# Patient Record
Sex: FEMALE | Race: WHITE | NOT HISPANIC OR LATINO | Employment: FULL TIME | URBAN - METROPOLITAN AREA
[De-identification: names, ages, dates, MRNs, and addresses within clinical notes are randomized per-mention and may not be internally consistent; named-entity substitution may affect disease eponyms.]

---

## 2020-01-22 ENCOUNTER — OFFICE VISIT (OUTPATIENT)
Dept: FAMILY MEDICINE CLINIC | Facility: CLINIC | Age: 35
End: 2020-01-22

## 2020-01-22 VITALS
WEIGHT: 160 LBS | RESPIRATION RATE: 18 BRPM | DIASTOLIC BLOOD PRESSURE: 60 MMHG | TEMPERATURE: 97.5 F | OXYGEN SATURATION: 100 % | SYSTOLIC BLOOD PRESSURE: 110 MMHG | HEART RATE: 82 BPM | HEIGHT: 61 IN | BODY MASS INDEX: 30.21 KG/M2

## 2020-01-22 DIAGNOSIS — Z00.00 HEALTHCARE MAINTENANCE: ICD-10-CM

## 2020-01-22 DIAGNOSIS — M25.50 MULTIPLE JOINT PAIN: ICD-10-CM

## 2020-01-22 DIAGNOSIS — R53.83 FATIGUE, UNSPECIFIED TYPE: ICD-10-CM

## 2020-01-22 DIAGNOSIS — F41.9 ANXIETY: ICD-10-CM

## 2020-01-22 DIAGNOSIS — R07.89 ATYPICAL CHEST PAIN: ICD-10-CM

## 2020-01-22 DIAGNOSIS — Z00.00 ENCOUNTER FOR MEDICAL EXAMINATION TO ESTABLISH CARE: Primary | ICD-10-CM

## 2020-01-22 LAB
BILIRUB BLD-MCNC: NEGATIVE MG/DL
CLARITY, POC: CLEAR
COLOR UR: YELLOW
GLUCOSE UR STRIP-MCNC: NEGATIVE MG/DL
KETONES UR QL: NEGATIVE
LEUKOCYTE EST, POC: NEGATIVE
NITRITE UR-MCNC: NEGATIVE MG/ML
PH UR: 6 [PH] (ref 5–8)
PROT UR STRIP-MCNC: NEGATIVE MG/DL
RBC # UR STRIP: ABNORMAL /UL
SP GR UR: 1.02 (ref 1–1.03)
UROBILINOGEN UR QL: NORMAL

## 2020-01-22 PROCEDURE — 93000 ELECTROCARDIOGRAM COMPLETE: CPT | Performed by: NURSE PRACTITIONER

## 2020-01-22 PROCEDURE — 99203 OFFICE O/P NEW LOW 30 MIN: CPT | Performed by: NURSE PRACTITIONER

## 2020-01-22 PROCEDURE — 81003 URINALYSIS AUTO W/O SCOPE: CPT | Performed by: NURSE PRACTITIONER

## 2020-01-22 RX ORDER — HYDROXYZINE HYDROCHLORIDE 25 MG/1
25 TABLET, FILM COATED ORAL 3 TIMES DAILY PRN
Qty: 90 TABLET | Refills: 1 | Status: SHIPPED | OUTPATIENT
Start: 2020-01-22 | End: 2021-07-19

## 2020-01-24 ENCOUNTER — LAB (OUTPATIENT)
Dept: FAMILY MEDICINE CLINIC | Facility: CLINIC | Age: 35
End: 2020-01-24

## 2020-01-24 DIAGNOSIS — M25.50 MULTIPLE JOINT PAIN: ICD-10-CM

## 2020-01-24 DIAGNOSIS — Z00.00 HEALTHCARE MAINTENANCE: ICD-10-CM

## 2020-01-24 DIAGNOSIS — R53.83 FATIGUE, UNSPECIFIED TYPE: ICD-10-CM

## 2020-01-24 LAB
ALBUMIN SERPL-MCNC: 4.4 G/DL (ref 3.5–5.2)
ALBUMIN/GLOB SERPL: 1.8 G/DL
ALP SERPL-CCNC: 80 U/L (ref 39–117)
ALT SERPL W P-5'-P-CCNC: 27 U/L (ref 1–33)
ANION GAP SERPL CALCULATED.3IONS-SCNC: 13.7 MMOL/L (ref 5–15)
AST SERPL-CCNC: 18 U/L (ref 1–32)
BASOPHILS # BLD AUTO: 0.03 10*3/MM3 (ref 0–0.2)
BASOPHILS NFR BLD AUTO: 0.4 % (ref 0–1.5)
BILIRUB SERPL-MCNC: 0.4 MG/DL (ref 0.2–1.2)
BUN BLD-MCNC: 11 MG/DL (ref 6–20)
BUN/CREAT SERPL: 15.3 (ref 7–25)
CALCIUM SPEC-SCNC: 9.4 MG/DL (ref 8.6–10.5)
CHLORIDE SERPL-SCNC: 104 MMOL/L (ref 98–107)
CHOLEST SERPL-MCNC: 146 MG/DL (ref 0–200)
CO2 SERPL-SCNC: 25.3 MMOL/L (ref 22–29)
CREAT BLD-MCNC: 0.72 MG/DL (ref 0.57–1)
CRP SERPL-MCNC: 0.35 MG/DL (ref 0–0.5)
DEPRECATED RDW RBC AUTO: 38.9 FL (ref 37–54)
EOSINOPHIL # BLD AUTO: 0.12 10*3/MM3 (ref 0–0.4)
EOSINOPHIL NFR BLD AUTO: 1.6 % (ref 0.3–6.2)
ERYTHROCYTE [DISTWIDTH] IN BLOOD BY AUTOMATED COUNT: 11.8 % (ref 12.3–15.4)
ERYTHROCYTE [SEDIMENTATION RATE] IN BLOOD: >140 MM/HR (ref 0–20)
GFR SERPL CREATININE-BSD FRML MDRD: 94 ML/MIN/1.73
GLOBULIN UR ELPH-MCNC: 2.5 GM/DL
GLUCOSE BLD-MCNC: 90 MG/DL (ref 65–99)
HBA1C MFR BLD: 5.05 % (ref 4.8–5.6)
HCT VFR BLD AUTO: 41.2 % (ref 34–46.6)
HDLC SERPL QL: 3.48
HDLC SERPL-MCNC: 42 MG/DL (ref 40–60)
HGB BLD-MCNC: 14.2 G/DL (ref 12–15.9)
IMM GRANULOCYTES # BLD AUTO: 0.02 10*3/MM3 (ref 0–0.05)
IMM GRANULOCYTES NFR BLD AUTO: 0.3 % (ref 0–0.5)
LDLC SERPL CALC-MCNC: 91 MG/DL (ref 0–100)
LYMPHOCYTES # BLD AUTO: 2.26 10*3/MM3 (ref 0.7–3.1)
LYMPHOCYTES NFR BLD AUTO: 29.9 % (ref 19.6–45.3)
MCH RBC QN AUTO: 31.3 PG (ref 26.6–33)
MCHC RBC AUTO-ENTMCNC: 34.5 G/DL (ref 31.5–35.7)
MCV RBC AUTO: 90.7 FL (ref 79–97)
MONOCYTES # BLD AUTO: 0.58 10*3/MM3 (ref 0.1–0.9)
MONOCYTES NFR BLD AUTO: 7.7 % (ref 5–12)
NEUTROPHILS # BLD AUTO: 4.55 10*3/MM3 (ref 1.7–7)
NEUTROPHILS NFR BLD AUTO: 60.1 % (ref 42.7–76)
NRBC BLD AUTO-RTO: 0 /100 WBC (ref 0–0.2)
PLATELET # BLD AUTO: 346 10*3/MM3 (ref 140–450)
PMV BLD AUTO: 10.8 FL (ref 6–12)
POTASSIUM BLD-SCNC: 4.2 MMOL/L (ref 3.5–5.2)
PROT SERPL-MCNC: 6.9 G/DL (ref 6–8.5)
RBC # BLD AUTO: 4.54 10*6/MM3 (ref 3.77–5.28)
SODIUM BLD-SCNC: 143 MMOL/L (ref 136–145)
TRIGL SERPL-MCNC: 66 MG/DL (ref 0–150)
TSH SERPL DL<=0.05 MIU/L-ACNC: 1.47 UIU/ML (ref 0.27–4.2)
VIT B12 BLD-MCNC: 410 PG/ML (ref 211–946)
VLDLC SERPL-MCNC: 13.2 MG/DL (ref 5–40)
WBC NRBC COR # BLD: 7.56 10*3/MM3 (ref 3.4–10.8)

## 2020-01-24 PROCEDURE — 82607 VITAMIN B-12: CPT | Performed by: NURSE PRACTITIONER

## 2020-01-24 PROCEDURE — 85025 COMPLETE CBC W/AUTO DIFF WBC: CPT | Performed by: NURSE PRACTITIONER

## 2020-01-24 PROCEDURE — 86038 ANTINUCLEAR ANTIBODIES: CPT | Performed by: NURSE PRACTITIONER

## 2020-01-24 PROCEDURE — 80061 LIPID PANEL: CPT | Performed by: NURSE PRACTITIONER

## 2020-01-24 PROCEDURE — 82652 VIT D 1 25-DIHYDROXY: CPT | Performed by: NURSE PRACTITIONER

## 2020-01-24 PROCEDURE — 85652 RBC SED RATE AUTOMATED: CPT | Performed by: NURSE PRACTITIONER

## 2020-01-24 PROCEDURE — 86140 C-REACTIVE PROTEIN: CPT | Performed by: NURSE PRACTITIONER

## 2020-01-24 PROCEDURE — 83036 HEMOGLOBIN GLYCOSYLATED A1C: CPT | Performed by: NURSE PRACTITIONER

## 2020-01-24 PROCEDURE — 86225 DNA ANTIBODY NATIVE: CPT

## 2020-01-24 PROCEDURE — 80053 COMPREHEN METABOLIC PANEL: CPT | Performed by: NURSE PRACTITIONER

## 2020-01-24 PROCEDURE — 84443 ASSAY THYROID STIM HORMONE: CPT | Performed by: NURSE PRACTITIONER

## 2020-01-25 LAB
ANA SER QL: POSITIVE
DSDNA AB SER-ACNC: 1 IU/ML (ref 0–9)
Lab: NORMAL

## 2020-01-27 LAB — 1,25(OH)2D3 SERPL-MCNC: 39.5 PG/ML (ref 19.9–79.3)

## 2020-02-04 ENCOUNTER — OFFICE VISIT (OUTPATIENT)
Dept: FAMILY MEDICINE CLINIC | Facility: CLINIC | Age: 35
End: 2020-02-04

## 2020-02-04 VITALS
RESPIRATION RATE: 17 BRPM | DIASTOLIC BLOOD PRESSURE: 60 MMHG | BODY MASS INDEX: 31.15 KG/M2 | WEIGHT: 165 LBS | OXYGEN SATURATION: 97 % | SYSTOLIC BLOOD PRESSURE: 120 MMHG | HEIGHT: 61 IN | HEART RATE: 120 BPM | TEMPERATURE: 97.9 F

## 2020-02-04 DIAGNOSIS — R76.8 POSITIVE ANA (ANTINUCLEAR ANTIBODY): Primary | ICD-10-CM

## 2020-02-04 PROCEDURE — 86235 NUCLEAR ANTIGEN ANTIBODY: CPT

## 2020-02-04 PROCEDURE — 86003 ALLG SPEC IGE CRUDE XTRC EA: CPT | Performed by: NURSE PRACTITIONER

## 2020-02-04 PROCEDURE — 86235 NUCLEAR ANTIGEN ANTIBODY: CPT | Performed by: NURSE PRACTITIONER

## 2020-02-04 PROCEDURE — 86225 DNA ANTIBODY NATIVE: CPT

## 2020-02-04 PROCEDURE — 99214 OFFICE O/P EST MOD 30 MIN: CPT | Performed by: NURSE PRACTITIONER

## 2020-02-04 PROCEDURE — 86038 ANTINUCLEAR ANTIBODIES: CPT | Performed by: NURSE PRACTITIONER

## 2020-02-04 PROCEDURE — 86376 MICROSOMAL ANTIBODY EACH: CPT | Performed by: NURSE PRACTITIONER

## 2020-02-04 NOTE — PROGRESS NOTES
"Shannon Baum is a 34 y.o. female who presents for follow up of fatigue and joint pain.    Chief Complaint   Patient presents with   • Results       Pain   This is a recurrent problem. The current episode started more than 1 year ago. The problem occurs daily. The problem has been gradually worsening. Associated symptoms include arthralgias, fatigue, joint swelling and myalgias. The symptoms are aggravated by stress. She has tried rest and NSAIDs for the symptoms. The treatment provided mild relief.   Fatigue   This is a recurrent problem. The current episode started more than 1 year ago. The problem occurs constantly. The problem has been unchanged. Associated symptoms include arthralgias, fatigue, joint swelling and myalgias. The symptoms are aggravated by stress and exertion. She has tried rest, sleep and relaxation for the symptoms. The treatment provided no relief.         No Known Allergies    Health Maintenance   Topic Date Due   • ANNUAL PHYSICAL  07/11/1990   • TDAP/TD VACCINES (1 - Tdap) 07/11/1998   • PNEUMOCOCCAL VACCINE (19-64 MEDIUM RISK) (1 of 1 - PPSV23) 07/11/2006   • INFLUENZA VACCINE  08/01/2019   • PAP SMEAR  01/22/2020        ROS    Review of Systems   Constitutional: Positive for fatigue.   Musculoskeletal: Positive for arthralgias, joint swelling and myalgias.   Psychiatric/Behavioral: Positive for stress.       Vitals:    02/04/20 1407   BP: 120/60   Pulse: 120   Resp: 17   Temp: 97.9 °F (36.6 °C)   SpO2: 97%   Weight: 74.8 kg (165 lb)   Height: 154.9 cm (61\")         Current Outpatient Medications:   •  hydrOXYzine (ATARAX) 25 MG tablet, Take 1 tablet by mouth 3 (Three) Times a Day As Needed for Anxiety., Disp: 90 tablet, Rfl: 1  •  levonorgestrel (MIRENA, 52 MG,) 20 MCG/24HR IUD, 1 each by Intrauterine route 1 (One) Time., Disp: , Rfl:     PE    Physical Exam   Constitutional: She is oriented to person, place, and time. She appears well-developed and well-nourished.   HENT:   Head: " Normocephalic and atraumatic.   Cardiovascular: Normal rate, regular rhythm and normal heart sounds. Exam reveals no gallop and no friction rub.   No murmur heard.  Pulmonary/Chest: Effort normal and breath sounds normal. No stridor. No respiratory distress. She has no wheezes. She has no rales.   Musculoskeletal:        Right hand: She exhibits tenderness.        Left hand: She exhibits tenderness.   Neurological: She is alert and oriented to person, place, and time.   Skin: Skin is warm and dry.   Psychiatric: She has a normal mood and affect. Her behavior is normal. Judgment and thought content normal.   Nursing note and vitals reviewed.       A/P    Problem List Items Addressed This Visit     None      Visit Diagnoses     Positive RISHI (antinuclear antibody)    -  Primary    Relevant Orders    Thyroid Peroxidase Antibody    Food Allergy Profile    RISHI With / DsDNA, RNP, Sjogrens A / B, Bailey      Discussed lab results at length. Spent 20 minutes face to face with patient discussing lab results, answering questions, and coordinating care. Will contact patient with lab results performed today.    Plan of care reviewed with patient at the conclusion of today's visit. Education was provided regarding diagnosis, management and any prescribed or recommended OTC medications.  Patient verbalizes understanding of and agreement with management plan.    Return in about 3 months (around 5/4/2020) for Recheck.     JEREMY Burnette

## 2020-02-06 LAB
ANA SER QL: POSITIVE
CENTROMERE B AB SER-ACNC: <0.2 AI (ref 0–0.9)
CHROMATIN AB SERPL-ACNC: <0.2 AI (ref 0–0.9)
DSDNA AB SER-ACNC: 1 IU/ML (ref 0–9)
ENA JO1 AB SER-ACNC: <0.2 AI (ref 0–0.9)
ENA RNP AB SER-ACNC: 1.2 AI (ref 0–0.9)
ENA SCL70 AB SER-ACNC: <0.2 AI (ref 0–0.9)
ENA SM AB SER-ACNC: <0.2 AI (ref 0–0.9)
ENA SS-A AB SER-ACNC: <0.2 AI (ref 0–0.9)
ENA SS-B AB SER-ACNC: <0.2 AI (ref 0–0.9)
Lab: ABNORMAL
THYROPEROXIDASE AB SERPL-ACNC: 6 IU/ML (ref 0–34)

## 2020-02-08 LAB
CALIF WALNUT POLN IGE QN: <0.1 KU/L
CLAM IGE QN: <0.1 KU/L
CODFISH IGE QN: <0.1 KU/L
CONV CLASS DESCRIPTION: ABNORMAL
CORN IGE QN: <0.1 KU/L
COW MILK IGE QN: 0.32 KU/L
EGG WHITE IGE QN: <0.1 KU/L
PEANUT IGE QN: <0.1 KU/L
SCALLOP IGE QN: <0.1 KU/L
SESAME SEED IGE: <0.1 KU/L
SHRIMP IGE: <0.1 KU/L
SOYBEAN IGE QN: <0.1 KU/L
WHEAT IGE QN: 0.15 KU/L

## 2020-02-21 ENCOUNTER — TELEPHONE (OUTPATIENT)
Dept: FAMILY MEDICINE CLINIC | Facility: CLINIC | Age: 35
End: 2020-02-21

## 2020-02-21 DIAGNOSIS — R53.83 FATIGUE, UNSPECIFIED TYPE: ICD-10-CM

## 2020-02-21 DIAGNOSIS — M25.50 MULTIPLE JOINT PAIN: ICD-10-CM

## 2020-02-21 DIAGNOSIS — K90.49 DAIRY PRODUCT INTOLERANCE: ICD-10-CM

## 2020-02-21 DIAGNOSIS — K90.41 WHEAT INTOLERANCE: Primary | ICD-10-CM

## 2020-02-21 DIAGNOSIS — R76.8 POSITIVE ANA (ANTINUCLEAR ANTIBODY): ICD-10-CM

## 2020-02-21 DIAGNOSIS — R76.8 POSITIVE SM/RNP ANTIBODY: ICD-10-CM

## 2020-02-21 RX ORDER — MELOXICAM 7.5 MG/1
7.5 TABLET ORAL 2 TIMES DAILY PRN
Qty: 60 TABLET | Refills: 2 | Status: SHIPPED | OUTPATIENT
Start: 2020-02-21 | End: 2021-07-19

## 2020-02-21 NOTE — TELEPHONE ENCOUNTER
Patient called to return and call from radha. Called office and no answer. Please advise an call pt back at [phone].

## 2020-02-21 NOTE — TELEPHONE ENCOUNTER
Called pt and advised her of her referral and results. Pt voiced her understanding.     Rima Malin, JEREMY Cadet, Chio SAM MA             Please let Shannon know that yes, I will send something to the pharmacy for her inflammation. I have also sent a referral to rheumatology for this. They will have to work this up as this other positive lab is also not extremely specific. Assure her we are trying to figure this out. Also, she is showing intolerances. I would like for her to see a nutritionist to discuss this as it could be the source of her GI problems.   Thanks

## 2020-02-21 NOTE — TELEPHONE ENCOUNTER
VM left for pt to return my call.       Rima Malin, JEREMY Cadet, Chio SAM, MA             Please let Shannon know that yes, I will send something to the pharmacy for her inflammation. I have also sent a referral to rheumatology for this. They will have to work this up as this other positive lab is also not extremely specific. Assure her we are trying to figure this out. Also, she is showing intolerances. I would like for her to see a nutritionist to discuss this as it could be the source of her GI problems.   Thanks

## 2020-02-25 ENCOUNTER — TELEPHONE (OUTPATIENT)
Dept: FAMILY MEDICINE CLINIC | Facility: CLINIC | Age: 35
End: 2020-02-25

## 2020-02-25 NOTE — TELEPHONE ENCOUNTER
I already spoke to pt about her labs.  ----- Message from Tg Elmore sent at 2/25/2020 10:19 AM EST -----  Regarding: RETURNED CALL  Contact: [phone]  PT RETURNED YOUR CALL REQUESTS THAT YOU CALL HER BACK AT WORK  ABOVE NUMBER

## 2021-07-19 ENCOUNTER — LAB (OUTPATIENT)
Dept: LAB | Facility: HOSPITAL | Age: 36
End: 2021-07-19

## 2021-07-19 ENCOUNTER — OFFICE VISIT (OUTPATIENT)
Dept: INTERNAL MEDICINE | Facility: CLINIC | Age: 36
End: 2021-07-19

## 2021-07-19 VITALS
RESPIRATION RATE: 16 BRPM | TEMPERATURE: 97.3 F | HEIGHT: 61 IN | WEIGHT: 156 LBS | HEART RATE: 78 BPM | SYSTOLIC BLOOD PRESSURE: 104 MMHG | OXYGEN SATURATION: 99 % | BODY MASS INDEX: 29.45 KG/M2 | DIASTOLIC BLOOD PRESSURE: 70 MMHG

## 2021-07-19 DIAGNOSIS — Z00.00 ANNUAL PHYSICAL EXAM: Primary | ICD-10-CM

## 2021-07-19 LAB
DEPRECATED RDW RBC AUTO: 39.2 FL (ref 37–54)
ERYTHROCYTE [DISTWIDTH] IN BLOOD BY AUTOMATED COUNT: 12.1 % (ref 12.3–15.4)
HBA1C MFR BLD: 4.85 % (ref 4.8–5.6)
HCT VFR BLD AUTO: 41.6 % (ref 34–46.6)
HGB BLD-MCNC: 14.4 G/DL (ref 12–15.9)
MCH RBC QN AUTO: 31.2 PG (ref 26.6–33)
MCHC RBC AUTO-ENTMCNC: 34.6 G/DL (ref 31.5–35.7)
MCV RBC AUTO: 90.2 FL (ref 79–97)
PLATELET # BLD AUTO: 384 10*3/MM3 (ref 140–450)
PMV BLD AUTO: 10.5 FL (ref 6–12)
RBC # BLD AUTO: 4.61 10*6/MM3 (ref 3.77–5.28)
WBC # BLD AUTO: 9.09 10*3/MM3 (ref 3.4–10.8)

## 2021-07-19 PROCEDURE — 99395 PREV VISIT EST AGE 18-39: CPT | Performed by: INTERNAL MEDICINE

## 2021-07-19 PROCEDURE — 80053 COMPREHEN METABOLIC PANEL: CPT | Performed by: INTERNAL MEDICINE

## 2021-07-19 PROCEDURE — 80061 LIPID PANEL: CPT | Performed by: INTERNAL MEDICINE

## 2021-07-19 PROCEDURE — 84443 ASSAY THYROID STIM HORMONE: CPT | Performed by: INTERNAL MEDICINE

## 2021-07-19 PROCEDURE — 82607 VITAMIN B-12: CPT | Performed by: INTERNAL MEDICINE

## 2021-07-19 PROCEDURE — 86803 HEPATITIS C AB TEST: CPT | Performed by: INTERNAL MEDICINE

## 2021-07-19 PROCEDURE — 85027 COMPLETE CBC AUTOMATED: CPT | Performed by: INTERNAL MEDICINE

## 2021-07-19 PROCEDURE — 82306 VITAMIN D 25 HYDROXY: CPT | Performed by: INTERNAL MEDICINE

## 2021-07-19 PROCEDURE — 36415 COLL VENOUS BLD VENIPUNCTURE: CPT | Performed by: INTERNAL MEDICINE

## 2021-07-19 PROCEDURE — 83036 HEMOGLOBIN GLYCOSYLATED A1C: CPT | Performed by: INTERNAL MEDICINE

## 2021-07-19 RX ORDER — CETIRIZINE HYDROCHLORIDE 10 MG/1
10 TABLET ORAL AS NEEDED
COMMUNITY

## 2021-07-19 NOTE — PROGRESS NOTES
Office Note      Date: 2021  Patient Name: Shannon Baum  MRN: [id number]  : 1985    Chief Complaint   Patient presents with   • Annual Exam       History of Present Illness: Shannon Baum is a 36 y.o. female who presents for Annual Exam.  Doing well today.  Last physical was 1 year ago.  Was having joint pain-it has since resolved.  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    Subjective      Review of Systems:   Pertinent review of systems per HPI.    Review of Systems   Constitutional: Negative for activity change, appetite change, chills, diaphoresis, fatigue, fever and unexpected weight change.   HENT: Negative for congestion, dental problem, drooling, ear discharge, ear pain, facial swelling, hearing loss and mouth sores.    Eyes: Negative for pain, discharge and itching.   Respiratory: Negative for apnea, cough, choking, chest tightness and shortness of breath.    Cardiovascular: Negative for chest pain, palpitations and leg swelling.   Gastrointestinal: Negative for abdominal distention, abdominal pain, blood in stool, constipation and diarrhea.   Endocrine: Negative for cold intolerance, heat intolerance, polydipsia and polyuria.   Genitourinary: Negative for difficulty urinating, dysuria, frequency and hematuria.   Skin: Negative for color change, pallor, rash and wound.   Allergic/Immunologic: Negative for environmental allergies, food allergies and immunocompromised state.   Neurological: Negative for dizziness, weakness and light-headedness.   Psychiatric/Behavioral: Negative for agitation, behavioral problems, confusion, decreased concentration and self-injury. The patient is not nervous/anxious.    All other systems reviewed and are negative.    No Known Allergies    Objective     Physical Exam:  Vital Signs:   Vitals:    21 1445   BP: 104/70   Pulse: 78   Resp: 16   Temp: 97.3 °F (36.3 °C)   TempSrc: Temporal   SpO2: 99%   Weight: 70.8 kg (156 lb)  "  Height: 154.9 cm (61\")   PainSc: 0-No pain      Body mass index is 29.48 kg/m².    Physical Exam  Vitals and nursing note reviewed.   Constitutional:       General: She is not in acute distress.     Appearance: She is well-developed.   HENT:      Head: Normocephalic and atraumatic.      Right Ear: External ear normal.      Left Ear: External ear normal.   Eyes:      General: No scleral icterus.        Right eye: No discharge.         Left eye: No discharge.      Conjunctiva/sclera: Conjunctivae normal.   Cardiovascular:      Rate and Rhythm: Normal rate and regular rhythm.      Heart sounds: Normal heart sounds. No murmur heard.   No friction rub. No gallop.    Pulmonary:      Effort: Pulmonary effort is normal. No respiratory distress.      Breath sounds: Normal breath sounds. No wheezing or rales.   Skin:     General: Skin is warm and dry.      Coloration: Skin is not pale.         Assessment / Plan      Assessment & Plan:    1. Annual physical exam  Counseled on:  Mammo starting at age 40  Pap smear q5 years if normal pap cytology with neg HPV, otherwise q3 years  Colonoscopy at 45-51 y/o  PNA vaccinations starting at age 65  shingrix at age 50  Td/Tdap q 10 years  Wear seatbelt when driving  Flu shot annually    - CBC (No Diff)  - Comprehensive Metabolic Panel  - Lipid Panel  - TSH Rfx On Abnormal To Free T4  - Vitamin B12  - Vitamin D 25 Hydroxy  - Hemoglobin A1c  - Hepatitis C Antibody      Tamar Montejo MD  07/19/2021     Please note that portions of this note may have been completed with a voice recognition program. Efforts were made to edit the dictations, but occasionally words are mistranscribed.  "

## 2021-07-20 LAB
25(OH)D3 SERPL-MCNC: 36.3 NG/ML (ref 30–100)
ALBUMIN SERPL-MCNC: 4.1 G/DL (ref 3.5–5.2)
ALBUMIN/GLOB SERPL: 1.6 G/DL
ALP SERPL-CCNC: 89 U/L (ref 39–117)
ALT SERPL W P-5'-P-CCNC: 20 U/L (ref 1–33)
ANION GAP SERPL CALCULATED.3IONS-SCNC: 7.5 MMOL/L (ref 5–15)
AST SERPL-CCNC: 11 U/L (ref 1–32)
BILIRUB SERPL-MCNC: 0.4 MG/DL (ref 0–1.2)
BUN SERPL-MCNC: 7 MG/DL (ref 6–20)
BUN/CREAT SERPL: 11.1 (ref 7–25)
CALCIUM SPEC-SCNC: 8.8 MG/DL (ref 8.6–10.5)
CHLORIDE SERPL-SCNC: 110 MMOL/L (ref 98–107)
CHOLEST SERPL-MCNC: 169 MG/DL (ref 0–200)
CO2 SERPL-SCNC: 24.5 MMOL/L (ref 22–29)
CREAT SERPL-MCNC: 0.63 MG/DL (ref 0.57–1)
GFR SERPL CREATININE-BSD FRML MDRD: 108 ML/MIN/1.73
GLOBULIN UR ELPH-MCNC: 2.5 GM/DL
GLUCOSE SERPL-MCNC: 69 MG/DL (ref 65–99)
HCV AB SER DONR QL: NORMAL
HDLC SERPL-MCNC: 41 MG/DL (ref 40–60)
LDLC SERPL CALC-MCNC: 90 MG/DL (ref 0–100)
LDLC/HDLC SERPL: 2.03 {RATIO}
POTASSIUM SERPL-SCNC: 3.9 MMOL/L (ref 3.5–5.2)
PROT SERPL-MCNC: 6.6 G/DL (ref 6–8.5)
SODIUM SERPL-SCNC: 142 MMOL/L (ref 136–145)
TRIGL SERPL-MCNC: 223 MG/DL (ref 0–150)
TSH SERPL DL<=0.05 MIU/L-ACNC: 0.95 UIU/ML (ref 0.27–4.2)
VIT B12 BLD-MCNC: 291 PG/ML (ref 211–946)
VLDLC SERPL-MCNC: 38 MG/DL (ref 5–40)

## 2022-03-07 ENCOUNTER — OFFICE VISIT (OUTPATIENT)
Dept: INTERNAL MEDICINE | Facility: CLINIC | Age: 37
End: 2022-03-07

## 2022-03-07 VITALS
SYSTOLIC BLOOD PRESSURE: 120 MMHG | RESPIRATION RATE: 16 BRPM | DIASTOLIC BLOOD PRESSURE: 76 MMHG | BODY MASS INDEX: 28.89 KG/M2 | WEIGHT: 153 LBS | OXYGEN SATURATION: 99 % | TEMPERATURE: 97.3 F | HEIGHT: 61 IN | HEART RATE: 72 BPM

## 2022-03-07 DIAGNOSIS — R07.9 CHEST PAIN, UNSPECIFIED TYPE: ICD-10-CM

## 2022-03-07 DIAGNOSIS — R70.0 ELEVATED SED RATE: Primary | ICD-10-CM

## 2022-03-07 DIAGNOSIS — F41.9 ANXIETY: ICD-10-CM

## 2022-03-07 PROCEDURE — 99214 OFFICE O/P EST MOD 30 MIN: CPT | Performed by: INTERNAL MEDICINE

## 2022-03-07 RX ORDER — ESCITALOPRAM OXALATE 5 MG/1
5 TABLET ORAL DAILY
Qty: 30 TABLET | Refills: 2 | Status: SHIPPED | OUTPATIENT
Start: 2022-03-07 | End: 2022-10-12 | Stop reason: SDUPTHER

## 2022-03-07 RX ORDER — HYDROXYZINE PAMOATE 50 MG/1
50 CAPSULE ORAL 3 TIMES DAILY PRN
Qty: 60 CAPSULE | Refills: 2 | Status: SHIPPED | OUTPATIENT
Start: 2022-03-07

## 2022-03-07 NOTE — PROGRESS NOTES
Office Note      Date: 2022  Patient Name: Shannon Baum  MRN: [id number]  : 1985    Chief Complaint   Patient presents with   • Anxiety   • Heartburn       History of Present Illness: Shannon Baum is a 36 y.o. female who presents for Anxiety and Heartburn. Has irritability and worsening of her anxiety due to covid stressors. Previously on lexapro 20mg. Has CP located beneath sternal region. Feels like a squeezing sensation, non radiating. No n/diaphoresis. Pain occurs randomly. Hx of elevated sed rate with neg RA workup. Seen by [facility]um.     Subjective      Review of Systems:   Pertinent review of systems per HPI.    Review of Systems   Constitutional: Negative for activity change, appetite change, chills, diaphoresis, fatigue, fever and unexpected weight change.   HENT: Negative for congestion, dental problem, drooling, ear discharge, ear pain, facial swelling, hearing loss and mouth sores.    Eyes: Negative for pain, discharge and itching.   Respiratory: Negative for apnea, cough, choking, chest tightness and shortness of breath.    Cardiovascular: Positive for chest pain. Negative for palpitations and leg swelling.   Gastrointestinal: Negative for abdominal distention, abdominal pain, blood in stool, constipation and diarrhea.   Endocrine: Negative for cold intolerance, heat intolerance, polydipsia and polyuria.   Genitourinary: Negative for difficulty urinating, dysuria, frequency and hematuria.   Skin: Negative for color change, pallor, rash and wound.   Allergic/Immunologic: Negative for environmental allergies, food allergies and immunocompromised state.   Neurological: Negative for dizziness, weakness and light-headedness.   Psychiatric/Behavioral: Negative for agitation, behavioral problems, confusion, decreased concentration and self-injury. The patient is nervous/anxious.    All other systems reviewed and are negative.    No Known Allergies    Objective     Physical Exam:  Vital  "Signs:   Vitals:    03/07/22 1157   BP: 120/76   Pulse: 72   Resp: 16   Temp: 97.3 °F (36.3 °C)   TempSrc: Temporal   SpO2: 99%   Weight: 69.4 kg (153 lb)   Height: 154.9 cm (61\")      Body mass index is 28.91 kg/m².    Physical Exam  Vitals and nursing note reviewed.   Constitutional:       General: She is not in acute distress.     Appearance: She is well-developed.   HENT:      Head: Normocephalic and atraumatic.      Right Ear: External ear normal.      Left Ear: External ear normal.   Cardiovascular:      Rate and Rhythm: Normal rate and regular rhythm.      Heart sounds: Normal heart sounds. No murmur heard.    No friction rub. No gallop.   Pulmonary:      Effort: Pulmonary effort is normal. No respiratory distress.      Breath sounds: Normal breath sounds. No wheezing or rales.   Abdominal:      General: Abdomen is flat. Bowel sounds are normal.      Palpations: Abdomen is soft.   Skin:     General: Skin is warm and dry.      Coloration: Skin is not pale.         Assessment / Plan      Assessment & Plan:    1. Elevated sed rate  Discussed non specific lab, can repeat at next annual physical to esure it goes down    2. Anxiety  Start on lexapro, f/u in 4 weeks.   - escitalopram (Lexapro) 5 MG tablet; Take 1 tablet by mouth Daily.  Dispense: 30 tablet; Refill: 2  - hydrOXYzine pamoate (VISTARIL) 50 MG capsule; Take 1 capsule by mouth 3 (Three) Times a Day As Needed for Anxiety.  Dispense: 60 capsule; Refill: 2    3. Chest pain, unspecified type  Likely related to anxiety and GERD. Discussed ACS sx. Trial of TUMs when she notices CP      Tamar Montejo MD  03/07/2022   Answers for HPI/ROS submitted by the patient on 3/5/2022  Please describe your symptoms.: Needing to be put back on my anxiety medicine. Having a lot of issues. , Also would like to discuss my cholesterol, and aching legs and chest.  Have you had these symptoms before?: Yes  How long have you been having these symptoms?: Greater than 2 weeks  What " is the primary reason for your visit?: Other

## 2022-10-12 ENCOUNTER — OFFICE VISIT (OUTPATIENT)
Dept: INTERNAL MEDICINE | Facility: CLINIC | Age: 37
End: 2022-10-12

## 2022-10-12 ENCOUNTER — LAB (OUTPATIENT)
Dept: LAB | Facility: HOSPITAL | Age: 37
End: 2022-10-12

## 2022-10-12 VITALS
OXYGEN SATURATION: 98 % | RESPIRATION RATE: 14 BRPM | HEIGHT: 61 IN | BODY MASS INDEX: 30.21 KG/M2 | HEART RATE: 89 BPM | WEIGHT: 160 LBS | TEMPERATURE: 97.8 F | DIASTOLIC BLOOD PRESSURE: 72 MMHG | SYSTOLIC BLOOD PRESSURE: 124 MMHG

## 2022-10-12 DIAGNOSIS — F41.9 ANXIETY: ICD-10-CM

## 2022-10-12 DIAGNOSIS — M79.622 LEFT AXILLARY PAIN: ICD-10-CM

## 2022-10-12 DIAGNOSIS — F51.01 PRIMARY INSOMNIA: ICD-10-CM

## 2022-10-12 DIAGNOSIS — R07.9 CHEST PAIN, UNSPECIFIED TYPE: ICD-10-CM

## 2022-10-12 DIAGNOSIS — Z00.00 ANNUAL PHYSICAL EXAM: Primary | ICD-10-CM

## 2022-10-12 DIAGNOSIS — Z23 FLU VACCINE NEED: ICD-10-CM

## 2022-10-12 PROCEDURE — 99395 PREV VISIT EST AGE 18-39: CPT | Performed by: INTERNAL MEDICINE

## 2022-10-12 PROCEDURE — 80061 LIPID PANEL: CPT | Performed by: INTERNAL MEDICINE

## 2022-10-12 PROCEDURE — 80050 GENERAL HEALTH PANEL: CPT | Performed by: INTERNAL MEDICINE

## 2022-10-12 PROCEDURE — 93000 ELECTROCARDIOGRAM COMPLETE: CPT | Performed by: INTERNAL MEDICINE

## 2022-10-12 PROCEDURE — 82607 VITAMIN B-12: CPT | Performed by: INTERNAL MEDICINE

## 2022-10-12 PROCEDURE — 99214 OFFICE O/P EST MOD 30 MIN: CPT | Performed by: INTERNAL MEDICINE

## 2022-10-12 PROCEDURE — 90471 IMMUNIZATION ADMIN: CPT | Performed by: INTERNAL MEDICINE

## 2022-10-12 PROCEDURE — 83036 HEMOGLOBIN GLYCOSYLATED A1C: CPT | Performed by: INTERNAL MEDICINE

## 2022-10-12 PROCEDURE — 90686 IIV4 VACC NO PRSV 0.5 ML IM: CPT | Performed by: INTERNAL MEDICINE

## 2022-10-12 RX ORDER — ESCITALOPRAM OXALATE 10 MG/1
10 TABLET ORAL DAILY
Qty: 30 TABLET | Refills: 3 | Status: SHIPPED | OUTPATIENT
Start: 2022-10-12 | End: 2022-12-06 | Stop reason: SDUPTHER

## 2022-10-12 NOTE — PROGRESS NOTES
Office Note      Date: 10/12/2022  Patient Name: Shannon Baum  MRN: [id number]  : 1985    Chief Complaint   Patient presents with   • Annual Exam   • Anxiety     Chest pain     • Breast Pain       History of Present Illness: Shannon Baum is a 37 y.o. female who presents for Annual Exam, Anxiety (Chest pain/), and Breast Pain. On lexapro for anxiety, feels like it helps some but not enough. Not sleeping well, will take benadryl to help her sleep.  CP can last up to 20-30 mins. No associated factors. CP feels like a heaviness. Also have left armpit pain. Has IUD and soto snot get her menstrual cycle. Drinks 1 cup of coffee per day.   Subjective      Review of Systems:   Pertinent review of systems per HPI.    Review of Systems   Constitutional: Negative for activity change, appetite change, chills, diaphoresis, fatigue, fever and unexpected weight change.   HENT: Negative for congestion, dental problem, drooling, ear discharge, ear pain, facial swelling, hearing loss and mouth sores.    Eyes: Negative for pain, discharge and itching.   Respiratory: Negative for apnea, cough, choking, chest tightness and shortness of breath.    Cardiovascular: Positive for chest pain. Negative for palpitations and leg swelling.   Gastrointestinal: Negative for abdominal distention, abdominal pain, blood in stool, constipation and diarrhea.   Endocrine: Negative for cold intolerance, heat intolerance, polydipsia and polyuria.   Genitourinary: Negative for difficulty urinating, dysuria, frequency and hematuria.   Skin: Negative for color change, pallor, rash and wound.   Allergic/Immunologic: Negative for environmental allergies, food allergies and immunocompromised state.   Neurological: Negative for dizziness, weakness and light-headedness.   Psychiatric/Behavioral: Negative for agitation, behavioral problems, confusion, decreased concentration and self-injury. The patient is not nervous/anxious.    All other systems  "reviewed and are negative.    No Known Allergies    Objective     Physical Exam:  Vital Signs:   Vitals:    10/12/22 1352   BP: 124/72   Pulse: 89   Resp: 14   Temp: 97.8 °F (36.6 °C)   TempSrc: Temporal   SpO2: 98%   Weight: 72.6 kg (160 lb)   Height: 154.9 cm (61\")   PainSc: 0-No pain      Body mass index is 30.23 kg/m².    Physical Exam  Vitals and nursing note reviewed.   Constitutional:       General: She is not in acute distress.     Appearance: She is well-developed.   HENT:      Head: Normocephalic and atraumatic.      Right Ear: External ear normal.      Left Ear: External ear normal.   Eyes:      General: No scleral icterus.        Right eye: No discharge.         Left eye: No discharge.      Conjunctiva/sclera: Conjunctivae normal.   Cardiovascular:      Rate and Rhythm: Normal rate and regular rhythm.      Heart sounds: Normal heart sounds. No murmur heard.    No friction rub. No gallop.   Pulmonary:      Effort: Pulmonary effort is normal. No respiratory distress.      Breath sounds: Normal breath sounds. No wheezing or rales.   Musculoskeletal:      Comments: B/l breast exam normal, no axillary LAD   Skin:     General: Skin is warm and dry.      Coloration: Skin is not pale.           Assessment / Plan      Assessment & Plan:    1. Annual physical exam  Counseled on:  Mammo starting at age 40  Pap smear q5 years if normal pap cytology with neg HPV, otherwise q3 years  Colonoscopy at 46 y/o  PNA vaccinations starting at age 65  shingrix at age 50  Td/Tdap q 10 years  Wear seatbelt when driving  Flu shot annually    - CBC (No Diff)  - Comprehensive Metabolic Panel  - Lipid Panel  - TSH Rfx On Abnormal To Free T4  - Vitamin B12  - Hemoglobin A1c    2. Anxiety  Not well controlled. Inc lexapro, f/u in 2 months.   - escitalopram (Lexapro) 10 MG tablet; Take 1 tablet by mouth Daily.  Dispense: 30 tablet; Refill: 3    3. Chest pain, unspecified type    ECG 12 Lead    Date/Time: 10/12/2022 2:36 PM  Performed " by: Tamar Montejo MD  Authorized by: Tamar Montejo MD   Comparison: not compared with previous ECG   Rhythm: sinus rhythm  Rate: normal  Conduction: conduction normal  QRS axis: normal    Clinical impression: normal ECG          EKG normal. Discussed other causes of CP including GERD.   Inc lexapro and see if this helps w/ CP  4. Flu vaccine need    - FluLaval/Fluzone >6 mos (3310-6214)    5. Left axillary pain  No LAD, discussed fibrocystic breast disease and other causes of axillary pain inc ingrown hairs.   6. Insomnia  Advised avoid taking benadryl frequently. Snores at night, advised flonase to see if that helps as she has nasal congestion. Also tx anxiety as above      Tamar Montejo MD  10/12/2022

## 2022-10-13 LAB
ALBUMIN SERPL-MCNC: 4.7 G/DL (ref 3.5–5.2)
ALBUMIN/GLOB SERPL: 2 G/DL
ALP SERPL-CCNC: 80 U/L (ref 39–117)
ALT SERPL W P-5'-P-CCNC: 20 U/L (ref 1–33)
ANION GAP SERPL CALCULATED.3IONS-SCNC: 10.6 MMOL/L (ref 5–15)
AST SERPL-CCNC: 18 U/L (ref 1–32)
BILIRUB SERPL-MCNC: 0.5 MG/DL (ref 0–1.2)
BUN SERPL-MCNC: 11 MG/DL (ref 6–20)
BUN/CREAT SERPL: 13.6 (ref 7–25)
CALCIUM SPEC-SCNC: 9.4 MG/DL (ref 8.6–10.5)
CHLORIDE SERPL-SCNC: 104 MMOL/L (ref 98–107)
CHOLEST SERPL-MCNC: 172 MG/DL (ref 0–200)
CO2 SERPL-SCNC: 26.4 MMOL/L (ref 22–29)
CREAT SERPL-MCNC: 0.81 MG/DL (ref 0.57–1)
DEPRECATED RDW RBC AUTO: 36.2 FL (ref 37–54)
EGFRCR SERPLBLD CKD-EPI 2021: 97.2 ML/MIN/1.73
ERYTHROCYTE [DISTWIDTH] IN BLOOD BY AUTOMATED COUNT: 11.4 % (ref 12.3–15.4)
GLOBULIN UR ELPH-MCNC: 2.3 GM/DL
GLUCOSE SERPL-MCNC: 82 MG/DL (ref 65–99)
HBA1C MFR BLD: 4.8 % (ref 4.8–5.6)
HCT VFR BLD AUTO: 39.7 % (ref 34–46.6)
HDLC SERPL-MCNC: 57 MG/DL (ref 40–60)
HGB BLD-MCNC: 14 G/DL (ref 12–15.9)
LDLC SERPL CALC-MCNC: 102 MG/DL (ref 0–100)
LDLC/HDLC SERPL: 1.77 {RATIO}
MCH RBC QN AUTO: 31.6 PG (ref 26.6–33)
MCHC RBC AUTO-ENTMCNC: 35.3 G/DL (ref 31.5–35.7)
MCV RBC AUTO: 89.6 FL (ref 79–97)
PLATELET # BLD AUTO: 357 10*3/MM3 (ref 140–450)
PMV BLD AUTO: 10.4 FL (ref 6–12)
POTASSIUM SERPL-SCNC: 3.7 MMOL/L (ref 3.5–5.2)
PROT SERPL-MCNC: 7 G/DL (ref 6–8.5)
RBC # BLD AUTO: 4.43 10*6/MM3 (ref 3.77–5.28)
SODIUM SERPL-SCNC: 141 MMOL/L (ref 136–145)
TRIGL SERPL-MCNC: 70 MG/DL (ref 0–150)
TSH SERPL DL<=0.05 MIU/L-ACNC: 1.34 UIU/ML (ref 0.27–4.2)
VIT B12 BLD-MCNC: 243 PG/ML (ref 211–946)
VLDLC SERPL-MCNC: 13 MG/DL (ref 5–40)
WBC NRBC COR # BLD: 11.35 10*3/MM3 (ref 3.4–10.8)

## 2022-12-06 ENCOUNTER — OFFICE VISIT (OUTPATIENT)
Dept: INTERNAL MEDICINE | Facility: CLINIC | Age: 37
End: 2022-12-06

## 2022-12-06 VITALS
BODY MASS INDEX: 29.83 KG/M2 | DIASTOLIC BLOOD PRESSURE: 64 MMHG | TEMPERATURE: 97.8 F | OXYGEN SATURATION: 98 % | HEIGHT: 61 IN | WEIGHT: 158 LBS | HEART RATE: 103 BPM | SYSTOLIC BLOOD PRESSURE: 120 MMHG

## 2022-12-06 DIAGNOSIS — L02.91 ABSCESS: Primary | ICD-10-CM

## 2022-12-06 DIAGNOSIS — F41.9 ANXIETY: ICD-10-CM

## 2022-12-06 PROCEDURE — 99214 OFFICE O/P EST MOD 30 MIN: CPT | Performed by: INTERNAL MEDICINE

## 2022-12-06 RX ORDER — DOXYCYCLINE HYCLATE 100 MG/1
100 CAPSULE ORAL 2 TIMES DAILY
Qty: 20 CAPSULE | Refills: 0 | Status: SHIPPED | OUTPATIENT
Start: 2022-12-06

## 2022-12-06 RX ORDER — ESCITALOPRAM OXALATE 10 MG/1
15 TABLET ORAL DAILY
Qty: 45 TABLET | Refills: 3 | Status: SHIPPED | OUTPATIENT
Start: 2022-12-06

## 2022-12-06 NOTE — PROGRESS NOTES
Office Note      Date: 2022  Patient Name: Shannon Baum  MRN: [id number]  : 1985    Chief Complaint   Patient presents with   • Arm Pain     PT has a boil in her left arm       History of Present Illness: Shannon Baum is a 37 y.o. female who presents for Arm Pain (PT has a boil in her left arm). Gets recurrent boils under left armpit. Has current boils. Feels lexapro is doing ok and would like to inc.     Subjective      Review of Systems:   Pertinent review of systems per HPI.    Review of Systems   Constitutional: Negative for activity change, appetite change, chills, diaphoresis, fatigue, fever and unexpected weight change.   HENT: Negative for congestion, dental problem, drooling, ear discharge, ear pain, facial swelling, hearing loss and mouth sores.    Eyes: Negative for pain, discharge and itching.   Respiratory: Negative for apnea, cough, choking, chest tightness and shortness of breath.    Cardiovascular: Negative for chest pain, palpitations and leg swelling.   Gastrointestinal: Negative for abdominal distention, abdominal pain, blood in stool, constipation and diarrhea.   Endocrine: Negative for cold intolerance, heat intolerance, polydipsia and polyuria.   Genitourinary: Negative for difficulty urinating, dysuria, frequency and hematuria.   Skin: Negative for color change, pallor, rash and wound.   Allergic/Immunologic: Negative for environmental allergies, food allergies and immunocompromised state.   Neurological: Negative for dizziness, weakness and light-headedness.   Psychiatric/Behavioral: Negative for agitation, behavioral problems, confusion, decreased concentration and self-injury. The patient is not nervous/anxious.    All other systems reviewed and are negative.    No Known Allergies    Objective     Physical Exam:  Vital Signs:   Vitals:    22 1206   BP: 120/64   BP Location: Left arm   Patient Position: Sitting   Cuff Size: Adult   Pulse: 103   Temp: 97.8 °F (36.6  "°C)   TempSrc: Infrared   SpO2: 98%  Comment: resting room air   Weight: 71.7 kg (158 lb)   Height: 154.9 cm (60.98\")      Body mass index is 29.87 kg/m².    Physical Exam  Vitals and nursing note reviewed.   Constitutional:       General: She is not in acute distress.     Appearance: She is well-developed.   HENT:      Head: Normocephalic and atraumatic.      Right Ear: External ear normal.      Left Ear: External ear normal.   Eyes:      General: No scleral icterus.        Right eye: No discharge.         Left eye: No discharge.      Conjunctiva/sclera: Conjunctivae normal.   Cardiovascular:      Rate and Rhythm: Normal rate and regular rhythm.      Heart sounds: Normal heart sounds. No murmur heard.    No friction rub. No gallop.   Pulmonary:      Effort: Pulmonary effort is normal. No respiratory distress.      Breath sounds: Normal breath sounds. No wheezing or rales.   Skin:     General: Skin is warm and dry.      Coloration: Skin is not pale.      Comments: 2 abscess under left axilla         Assessment / Plan      Assessment & Plan:    1. Abscess  rx for doxy. Advised warm compresses. Do not squeeze or irritate abscesses  - doxycycline (VIBRAMYCIN) 100 MG capsule; Take 1 capsule by mouth 2 (Two) Times a Day.  Dispense: 20 capsule; Refill: 0    2. Anxiety  Inc lexapro to 15mg. F/u in 4 weeks if not improved.   - escitalopram (Lexapro) 10 MG tablet; Take 1.5 tablets by mouth Daily.  Dispense: 45 tablet; Refill: 3      Tamar Montejo MD  12/06/2022   "

## 2023-04-07 ENCOUNTER — OFFICE VISIT (OUTPATIENT)
Dept: INTERNAL MEDICINE | Facility: CLINIC | Age: 38
End: 2023-04-07
Payer: COMMERCIAL

## 2023-04-07 VITALS
HEIGHT: 61 IN | OXYGEN SATURATION: 97 % | HEART RATE: 90 BPM | WEIGHT: 167 LBS | DIASTOLIC BLOOD PRESSURE: 74 MMHG | BODY MASS INDEX: 31.53 KG/M2 | SYSTOLIC BLOOD PRESSURE: 108 MMHG

## 2023-04-07 DIAGNOSIS — L24.9 IRRITANT CONTACT DERMATITIS, UNSPECIFIED TRIGGER: ICD-10-CM

## 2023-04-07 DIAGNOSIS — D72.829 LEUKOCYTOSIS, UNSPECIFIED TYPE: Primary | ICD-10-CM

## 2023-04-07 DIAGNOSIS — F41.9 ANXIETY: ICD-10-CM

## 2023-04-07 DIAGNOSIS — G47.33 OSA (OBSTRUCTIVE SLEEP APNEA): ICD-10-CM

## 2023-04-07 DIAGNOSIS — E66.09 CLASS 1 OBESITY DUE TO EXCESS CALORIES WITH SERIOUS COMORBIDITY AND BODY MASS INDEX (BMI) OF 31.0 TO 31.9 IN ADULT: ICD-10-CM

## 2023-04-07 PROCEDURE — 99214 OFFICE O/P EST MOD 30 MIN: CPT | Performed by: INTERNAL MEDICINE

## 2023-04-07 RX ORDER — FAMOTIDINE 40 MG/1
TABLET, FILM COATED ORAL DAILY
COMMUNITY
Start: 2023-03-25

## 2023-04-07 RX ORDER — PREDNISONE 10 MG/1
TABLET ORAL TAKE AS DIRECTED
COMMUNITY
Start: 2023-04-05

## 2023-04-07 RX ORDER — TRIAMCINOLONE ACETONIDE 1 MG/G
CREAM TOPICAL 2 TIMES DAILY
COMMUNITY
Start: 2023-04-05

## 2023-04-07 RX ORDER — ESCITALOPRAM OXALATE 20 MG/1
20 TABLET ORAL DAILY
Qty: 30 TABLET | Refills: 2 | Status: SHIPPED | OUTPATIENT
Start: 2023-04-07

## 2023-04-07 NOTE — PROGRESS NOTES
Office Note      Date: 2023  Patient Name: Shannon Baum  MRN: [id number]  : 1985    Chief Complaint   Patient presents with   • Labs Only     WBC recheck   • Anxiety       History of Present Illness: Shannon Baum is a 37 y.o. female who presents for Labs Only (WBC recheck) and Anxiety.     Would like to increase Lexapro to 20 mg once daily.  Being treated with repeat course of steroids for contact dermatitis.  No new medications.  Has fatigue and inability to lose weight.  Snores at night.    Subjective      Review of Systems:   Pertinent review of systems per HPI.    Review of Systems   Constitutional: Positive for fatigue. Negative for activity change, appetite change, chills, diaphoresis, fever and unexpected weight change.   HENT: Negative for congestion, dental problem, drooling, ear discharge, ear pain, facial swelling, hearing loss and mouth sores.    Eyes: Negative for pain, discharge and itching.   Respiratory: Negative for apnea, cough, choking, chest tightness and shortness of breath.    Cardiovascular: Negative for chest pain, palpitations and leg swelling.   Gastrointestinal: Negative for abdominal distention, abdominal pain, blood in stool, constipation and diarrhea.   Endocrine: Negative for cold intolerance, heat intolerance, polydipsia and polyuria.   Genitourinary: Negative for difficulty urinating, dysuria, frequency and hematuria.   Skin: Positive for rash. Negative for color change, pallor and wound.   Allergic/Immunologic: Negative for environmental allergies, food allergies and immunocompromised state.   Neurological: Negative for dizziness, weakness and light-headedness.   Psychiatric/Behavioral: Negative for agitation, behavioral problems, confusion, decreased concentration and self-injury. The patient is not nervous/anxious.    All other systems reviewed and are negative.    No Known Allergies    Objective     Physical Exam:  Vital Signs:   Vitals:    23 0842  "  BP: 108/74   BP Location: Left arm   Pulse: 90   SpO2: 97%   Weight: 75.8 kg (167 lb)   Height: 154.9 cm (61\")      Body mass index is 31.55 kg/m².    Physical Exam  Vitals and nursing note reviewed.   Constitutional:       General: She is not in acute distress.     Appearance: She is well-developed.   HENT:      Head: Normocephalic and atraumatic.      Right Ear: External ear normal.      Left Ear: External ear normal.   Eyes:      General: No scleral icterus.        Right eye: No discharge.         Left eye: No discharge.      Conjunctiva/sclera: Conjunctivae normal.   Cardiovascular:      Rate and Rhythm: Normal rate and regular rhythm.      Heart sounds: Normal heart sounds. No murmur heard.    No friction rub. No gallop.   Pulmonary:      Effort: Pulmonary effort is normal. No respiratory distress.      Breath sounds: Normal breath sounds. No wheezing or rales.   Skin:     General: Skin is warm and dry.      Coloration: Skin is not pale.      Comments: Splotchy pink skin on back         Assessment / Plan      Assessment & Plan:    1. Leukocytosis, unspecified type  We will hold off on repeating CBC at this time due to current steroid use for contact dermatitis.  Follow-up in 1 month for CBC  - CBC & Differential    2. Anxiety  Chronic medical issue, increase Lexapro  - escitalopram (Lexapro) 20 MG tablet; Take 1 tablet by mouth Daily.  Dispense: 30 tablet; Refill: 2    3. ROXY (obstructive sleep apnea)  New medical issue, referral to sleep medicine  - Ambulatory Referral to Sleep Medicine    4. Irritant contact dermatitis, unspecified trigger  New medical issues stable, continue steroids and allergy pills    5. Class 1 obesity due to excess calories with serious comorbidity and body mass index (BMI) of 31.0 to 31.9 in adult  Counseled on weight loss and diet.  ROXY paul Montejo MD  04/07/2023   "

## 2023-05-10 ENCOUNTER — LAB (OUTPATIENT)
Dept: LAB | Facility: HOSPITAL | Age: 38
End: 2023-05-10
Payer: COMMERCIAL

## 2023-05-10 LAB
BASOPHILS # BLD AUTO: 0.04 10*3/MM3 (ref 0–0.2)
BASOPHILS NFR BLD AUTO: 0.5 % (ref 0–1.5)
DEPRECATED RDW RBC AUTO: 38.6 FL (ref 37–54)
EOSINOPHIL # BLD AUTO: 0.08 10*3/MM3 (ref 0–0.4)
EOSINOPHIL NFR BLD AUTO: 1 % (ref 0.3–6.2)
ERYTHROCYTE [DISTWIDTH] IN BLOOD BY AUTOMATED COUNT: 11.8 % (ref 12.3–15.4)
HCT VFR BLD AUTO: 38 % (ref 34–46.6)
HGB BLD-MCNC: 13.4 G/DL (ref 12–15.9)
IMM GRANULOCYTES # BLD AUTO: 0.05 10*3/MM3 (ref 0–0.05)
IMM GRANULOCYTES NFR BLD AUTO: 0.6 % (ref 0–0.5)
LYMPHOCYTES # BLD AUTO: 2.13 10*3/MM3 (ref 0.7–3.1)
LYMPHOCYTES NFR BLD AUTO: 25.8 % (ref 19.6–45.3)
MCH RBC QN AUTO: 31.5 PG (ref 26.6–33)
MCHC RBC AUTO-ENTMCNC: 35.3 G/DL (ref 31.5–35.7)
MCV RBC AUTO: 89.2 FL (ref 79–97)
MONOCYTES # BLD AUTO: 0.64 10*3/MM3 (ref 0.1–0.9)
MONOCYTES NFR BLD AUTO: 7.7 % (ref 5–12)
NEUTROPHILS NFR BLD AUTO: 5.33 10*3/MM3 (ref 1.7–7)
NEUTROPHILS NFR BLD AUTO: 64.4 % (ref 42.7–76)
NRBC BLD AUTO-RTO: 0 /100 WBC (ref 0–0.2)
PLATELET # BLD AUTO: 338 10*3/MM3 (ref 140–450)
PMV BLD AUTO: 10 FL (ref 6–12)
RBC # BLD AUTO: 4.26 10*6/MM3 (ref 3.77–5.28)
WBC NRBC COR # BLD: 8.27 10*3/MM3 (ref 3.4–10.8)

## 2023-05-10 PROCEDURE — 85025 COMPLETE CBC W/AUTO DIFF WBC: CPT | Performed by: INTERNAL MEDICINE

## 2023-05-26 ENCOUNTER — TELEMEDICINE (OUTPATIENT)
Dept: SLEEP MEDICINE | Facility: HOSPITAL | Age: 38
End: 2023-05-26
Payer: COMMERCIAL

## 2023-05-26 VITALS — HEIGHT: 61 IN | WEIGHT: 165 LBS | BODY MASS INDEX: 31.15 KG/M2

## 2023-05-26 DIAGNOSIS — G47.19 EXCESSIVE DAYTIME SLEEPINESS: ICD-10-CM

## 2023-05-26 DIAGNOSIS — R29.818 SUSPECTED SLEEP APNEA: Primary | ICD-10-CM

## 2023-05-26 DIAGNOSIS — R51.9 MORNING HEADACHE: ICD-10-CM

## 2023-05-26 DIAGNOSIS — R00.2 PALPITATION: ICD-10-CM

## 2023-05-26 NOTE — PROGRESS NOTES
Chief Complaint:   Chief Complaint   Patient presents with   • Sleeping Problem       HPI:    Shannon Baum is a 37 y.o. female here to establish care.  Patient sees Dr. Montejo's primary care provider.  Patient does have some history of depression, anxiety, and seasonal allergies.  She is also suspected to have sleep apnea.    For the past 1 year patient has noticed increased snoring, daytime sleepiness and fatigue, frequent awakenings, morning headaches, dry mouth, nasal allergies, teeth grinding, difficulty staying asleep, nighttime sweating and lack of concentration.  Patient does deny hypnagogue hallucinations, sleep paralysis, nasal fracture in head injury.  Although, patient states she does have a history of deviated septum.    During the week she will go to bed at 10 PM and getting up at 6 AM and on the weekend going to bed at 11 PM getting up at 8 AM.  She estimates she is only getting 4 hours of sleep while in bed she is always tossing and turning with a hard time going back to sleep.  We usually take her 1 to 2 hours to go to sleep initially.  Patient has an Sanger score of 11/24.    We did speak today about the consequences of untreated sleep apnea such as hypertension, atrial fibrillation, stroke, early onset dementia and sudden cardiac death.  We also discussed different therapies available to her such as CPAP, MAD, or ENT referral and patient verbalizes understanding.    Social history  This is a very pleasant 37-year-old female.  Patient is a non-smoker and denies illicit substance use.  She will drink an alcoholic beverage 2-3 times a week.  She will have 1 regular coffee daily into regular micheline.    Past Medical History:   Diagnosis Date   • Anxiety        Family History   Problem Relation Age of Onset   • Diabetes Mother         type 2   • No Known Problems Father    • No Known Problems Sister    • No Known Problems Brother    • Breast cancer Maternal Grandmother    • Diabetes Maternal Grandmother     • Breast cancer - additional onset Maternal Grandmother    • Diabetes Paternal Grandfather        Past Surgical History:   Procedure Laterality Date   • NO PAST SURGERIES           Current medications are:   Current Outpatient Medications:   •  cetirizine (zyrTEC) 10 MG tablet, Take 1 tablet by mouth As Needed., Disp: , Rfl:   •  escitalopram (Lexapro) 20 MG tablet, Take 1 tablet by mouth Daily., Disp: 30 tablet, Rfl: 2  •  hydrOXYzine pamoate (VISTARIL) 50 MG capsule, Take 1 capsule by mouth 3 (Three) Times a Day As Needed for Anxiety., Disp: 60 capsule, Rfl: 2  •  levonorgestrel (MIRENA) 20 MCG/24HR IUD, 1 each by Intrauterine route 1 (One) Time., Disp: , Rfl:   •  Omega-3 Fatty Acids (FISH OIL PO), Take  by mouth., Disp: , Rfl:   •  triamcinolone (KENALOG) 0.1 % cream, 2 (Two) Times a Day., Disp: , Rfl:   •  valACYclovir (VALTREX) 1000 MG tablet, As Needed., Disp: , Rfl: .      The patient's relevant past medical, surgical, family and social history were reviewed and updated in Epic as appropriate.       Review of Systems   Constitutional: Positive for activity change and fatigue.   HENT: Positive for congestion and sneezing.    Cardiovascular: Positive for palpitations.   Endocrine: Positive for cold intolerance and heat intolerance.   Allergic/Immunologic: Positive for environmental allergies.   Hematological: Bruises/bleeds easily.   Psychiatric/Behavioral: Positive for agitation, dysphoric mood and sleep disturbance. The patient is nervous/anxious.    All other systems reviewed and are negative.        Objective:    Physical Exam  Constitutional:       Appearance: Normal appearance.   HENT:      Head: Normocephalic and atraumatic.   Pulmonary:      Effort: Pulmonary effort is normal. No respiratory distress.   Neurological:      Mental Status: She is alert and oriented to person, place, and time.   Psychiatric:         Mood and Affect: Mood normal.         Behavior: Behavior normal.         Thought Content:  Thought content normal.         Judgment: Judgment normal.             ASSESSMENT/PLAN    Diagnoses and all orders for this visit:    1. Suspected sleep apnea (Primary)  -     Home Sleep Study; Future    2. Palpitation  -     Home Sleep Study; Future    3. Excessive daytime sleepiness  -     Home Sleep Study; Future    4. Morning headache  -     Home Sleep Study; Future        1. Counseled patient regarding multimodal approach with healthy nutrition, healthy sleep, regular physical activity, social activities, counseling, and medications. Encouraged to practice lateral sleep position. Avoid alcohol and sedatives close to bedtime.  2.   Due to strong story for sleep apnea and consequences of untreated sleep apnea we will move forward with HST and follow-up as appropriate.  Patient gave consent for video visit.    I have reviewed the results of my evaluation and impression and discussed my recommendations in detail with the patient.      Signed by  Letty Louis, APRN    May 26, 2023      CC: Tamar Montejo MD Blake, Diana Truong, MD

## 2023-08-01 DIAGNOSIS — F41.9 ANXIETY: ICD-10-CM

## 2023-08-01 RX ORDER — ESCITALOPRAM OXALATE 20 MG/1
20 TABLET ORAL DAILY
Qty: 30 TABLET | Refills: 2 | Status: SHIPPED | OUTPATIENT
Start: 2023-08-01

## 2023-08-23 ENCOUNTER — HOSPITAL ENCOUNTER (OUTPATIENT)
Dept: SLEEP MEDICINE | Facility: HOSPITAL | Age: 38
Discharge: HOME | End: 2023-08-23
Admitting: NURSE PRACTITIONER
Payer: COMMERCIAL

## 2023-08-23 VITALS — BODY MASS INDEX: 31.15 KG/M2 | WEIGHT: 165 LBS | HEIGHT: 61 IN

## 2023-08-23 DIAGNOSIS — R29.818 SUSPECTED SLEEP APNEA: ICD-10-CM

## 2023-08-23 DIAGNOSIS — G47.19 EXCESSIVE DAYTIME SLEEPINESS: ICD-10-CM

## 2023-08-23 DIAGNOSIS — R00.2 PALPITATION: ICD-10-CM

## 2023-08-23 DIAGNOSIS — R51.9 MORNING HEADACHE: ICD-10-CM

## 2023-08-23 PROCEDURE — 95806 SLEEP STUDY UNATT&RESP EFFT: CPT

## 2023-08-25 ENCOUNTER — TELEMEDICINE (OUTPATIENT)
Dept: SLEEP MEDICINE | Facility: HOSPITAL | Age: 38
End: 2023-08-25
Payer: COMMERCIAL

## 2023-08-25 VITALS — HEIGHT: 61 IN | WEIGHT: 164 LBS | BODY MASS INDEX: 30.96 KG/M2

## 2023-08-25 DIAGNOSIS — G47.33 OSA (OBSTRUCTIVE SLEEP APNEA): Primary | ICD-10-CM

## 2023-08-25 NOTE — PROGRESS NOTES
Chief Complaint:   Chief Complaint   Patient presents with    Follow-up       HPI:    Shannon Baum is a 38 y.o. female here for follow-up of sleep apnea.  Patient was last seen 5/26/2023 in consult for snoring, excessive daytime sleepiness, morning headaches and decreased concentration.  Patient had a sleep study 8/23/2023 states this score might be a little higher as she was having difficulty sleeping on her back that night.  Her AHI was 5.7.    During the week she will go to bed at 10 PM and getting up at 6 AM and on the weekend going to bed at 11 PM getting up at 8 AM.  She estimates she is only getting 4 hours of sleep while in bed she is always tossing and turning with a hard time going back to sleep.  We usually take her 1 to 2 hours to go to sleep initially.  Patient has an Center score of 11/24.    We did speak today about AHI 5.7 and different therapies available to her such as CPAP, MAD, or ENT referral.  Patient does verbalize understanding.  Patient does wish to initiate CPAP therapy.           Current medications are:   Current Outpatient Medications:     cetirizine (zyrTEC) 10 MG tablet, Take 1 tablet by mouth As Needed., Disp: , Rfl:     escitalopram (Lexapro) 20 MG tablet, Take 1 tablet by mouth Daily., Disp: 30 tablet, Rfl: 2    hydrOXYzine pamoate (VISTARIL) 50 MG capsule, Take 1 capsule by mouth 3 (Three) Times a Day As Needed for Anxiety., Disp: 60 capsule, Rfl: 2    levonorgestrel (MIRENA) 20 MCG/24HR IUD, 1 each by Intrauterine route 1 (One) Time., Disp: , Rfl:     Omega-3 Fatty Acids (FISH OIL PO), Take  by mouth., Disp: , Rfl:     triamcinolone (KENALOG) 0.1 % cream, 2 (Two) Times a Day., Disp: , Rfl:     valACYclovir (VALTREX) 1000 MG tablet, As Needed., Disp: , Rfl:     zolpidem (Ambien) 5 MG tablet, 1 p.o. at onset of sleep study may repeat in 1 hour if needed, Disp: 2 tablet, Rfl: 0.      The patient's relevant past medical, surgical, family and social history were reviewed and updated  in Epic as appropriate.       Review of Systems   Constitutional:  Positive for activity change and fatigue.   HENT:  Positive for congestion and sneezing.    Respiratory:  Positive for apnea.    Cardiovascular:  Positive for palpitations.   Endocrine: Positive for cold intolerance and heat intolerance.   Allergic/Immunologic: Positive for environmental allergies.   Hematological:  Bruises/bleeds easily.   Psychiatric/Behavioral:  Positive for agitation, decreased concentration, dysphoric mood and sleep disturbance. The patient is nervous/anxious.    All other systems reviewed and are negative.      Objective:    Physical Exam  Constitutional:       Appearance: Normal appearance.   HENT:      Head: Normocephalic and atraumatic.   Pulmonary:      Effort: Pulmonary effort is normal. No respiratory distress.   Neurological:      Mental Status: She is alert and oriented to person, place, and time.   Psychiatric:         Mood and Affect: Mood normal.         Behavior: Behavior normal.         Thought Content: Thought content normal.         Judgment: Judgment normal.         ASSESSMENT/PLAN    Diagnoses and all orders for this visit:    1. ROXY (obstructive sleep apnea) (Primary)        Counseled patient regarding multimodal approach with healthy nutrition, healthy sleep, regular physical activity, social activities, counseling, and medications. Encouraged to practice lateral sleep position. Avoid alcohol and sedatives close to bedtime.  Does wish to initiate CPAP therapy we will send order to [facility] follow-up 31 to 90 days.  Patient gave consent for video visit.      I have reviewed the results of my evaluation and impression and discussed my recommendations in detail with the patient.      Signed by  JEREMY Whitman    August 25, 2023      CC: Tamar Montejo MD         No ref. provider found

## 2023-08-25 NOTE — PROGRESS NOTES
ADVISED PATIENT OF STUDY RESULTS VIA [facility]. AWAITING RESPONSE ON DECISION FOR ROXY TREATMENT.

## 2023-10-17 ENCOUNTER — LAB (OUTPATIENT)
Dept: INTERNAL MEDICINE | Facility: CLINIC | Age: 38
End: 2023-10-17
Payer: COMMERCIAL

## 2023-10-17 ENCOUNTER — OFFICE VISIT (OUTPATIENT)
Dept: INTERNAL MEDICINE | Facility: CLINIC | Age: 38
End: 2023-10-17
Payer: COMMERCIAL

## 2023-10-17 VITALS
DIASTOLIC BLOOD PRESSURE: 74 MMHG | HEIGHT: 61 IN | RESPIRATION RATE: 16 BRPM | OXYGEN SATURATION: 98 % | WEIGHT: 163 LBS | HEART RATE: 95 BPM | SYSTOLIC BLOOD PRESSURE: 114 MMHG | BODY MASS INDEX: 30.78 KG/M2 | TEMPERATURE: 97.4 F

## 2023-10-17 DIAGNOSIS — Z13.220 LIPID SCREENING: ICD-10-CM

## 2023-10-17 DIAGNOSIS — Z13.29 THYROID DISORDER SCREENING: ICD-10-CM

## 2023-10-17 DIAGNOSIS — F41.9 ANXIETY: Primary | ICD-10-CM

## 2023-10-17 DIAGNOSIS — F41.9 ANXIETY: ICD-10-CM

## 2023-10-17 DIAGNOSIS — Z13.21 ENCOUNTER FOR VITAMIN DEFICIENCY SCREENING: ICD-10-CM

## 2023-10-17 DIAGNOSIS — Z11.59 ENCOUNTER FOR HEPATITIS C SCREENING TEST FOR LOW RISK PATIENT: ICD-10-CM

## 2023-10-17 DIAGNOSIS — Z13.0 SCREENING FOR BLOOD DISEASE: ICD-10-CM

## 2023-10-17 DIAGNOSIS — Z00.00 WELLNESS EXAMINATION: Primary | ICD-10-CM

## 2023-10-17 DIAGNOSIS — Z13.1 SCREENING FOR DIABETES MELLITUS: ICD-10-CM

## 2023-10-17 PROBLEM — L02.412 ABSCESS OF LEFT AXILLA: Status: ACTIVE | Noted: 2023-10-17

## 2023-10-17 PROBLEM — B00.9 HSV-1 INFECTION: Status: ACTIVE | Noted: 2023-10-17

## 2023-10-17 PROBLEM — J30.89 ENVIRONMENTAL AND SEASONAL ALLERGIES: Status: ACTIVE | Noted: 2023-10-17

## 2023-10-17 LAB
25(OH)D3 SERPL-MCNC: 36.7 NG/ML (ref 30–100)
DEPRECATED RDW RBC AUTO: 38.4 FL (ref 37–54)
ERYTHROCYTE [DISTWIDTH] IN BLOOD BY AUTOMATED COUNT: 12 % (ref 12.3–15.4)
FOLATE SERPL-MCNC: 8.39 NG/ML (ref 4.78–24.2)
HCT VFR BLD AUTO: 40.5 % (ref 34–46.6)
HGB BLD-MCNC: 14.1 G/DL (ref 12–15.9)
MCH RBC QN AUTO: 31.2 PG (ref 26.6–33)
MCHC RBC AUTO-ENTMCNC: 34.8 G/DL (ref 31.5–35.7)
MCV RBC AUTO: 89.6 FL (ref 79–97)
PLATELET # BLD AUTO: 366 10*3/MM3 (ref 140–450)
PMV BLD AUTO: 10.3 FL (ref 6–12)
RBC # BLD AUTO: 4.52 10*6/MM3 (ref 3.77–5.28)
VIT B12 BLD-MCNC: 363 PG/ML (ref 211–946)
WBC NRBC COR # BLD: 8.11 10*3/MM3 (ref 3.4–10.8)

## 2023-10-17 PROCEDURE — 80061 LIPID PANEL: CPT | Performed by: NURSE PRACTITIONER

## 2023-10-17 PROCEDURE — 36415 COLL VENOUS BLD VENIPUNCTURE: CPT | Performed by: NURSE PRACTITIONER

## 2023-10-17 PROCEDURE — 99395 PREV VISIT EST AGE 18-39: CPT | Performed by: NURSE PRACTITIONER

## 2023-10-17 PROCEDURE — 80050 GENERAL HEALTH PANEL: CPT | Performed by: NURSE PRACTITIONER

## 2023-10-17 PROCEDURE — 83036 HEMOGLOBIN GLYCOSYLATED A1C: CPT | Performed by: NURSE PRACTITIONER

## 2023-10-17 PROCEDURE — 82306 VITAMIN D 25 HYDROXY: CPT | Performed by: NURSE PRACTITIONER

## 2023-10-17 PROCEDURE — 82746 ASSAY OF FOLIC ACID SERUM: CPT | Performed by: NURSE PRACTITIONER

## 2023-10-17 PROCEDURE — 82607 VITAMIN B-12: CPT | Performed by: NURSE PRACTITIONER

## 2023-10-17 RX ORDER — ESCITALOPRAM OXALATE 20 MG/1
20 TABLET ORAL DAILY
Qty: 90 TABLET | Refills: 3 | Status: SHIPPED | OUTPATIENT
Start: 2023-10-17

## 2023-10-17 NOTE — PROGRESS NOTES
Subjective   Chief Complaint   Patient presents with    Annual Exam       Shannon Baum is a 38 y.o. female here today for annual exam.    he patient is here today for an annual physical.      She has been a patient of Dr. Tamar Montejo. The patient is currently taking Lexapro and requires a refill. Her symptoms are well controlled with Lexapro. She takes hydroxyzine as needed. The patient had a sleep study done at [facility] [place], [place] and was required to take Ambien, but she did not take it.    The patient feels healthy overall. She does not exercise. She is improving her dietary habits and tries to eat a well-balanced diet. She does not take vitamin supplements. She drinks alcohol moderately but denies any tobacco use or vaping. She has an IUD and does not menstruate. Her Pap smear is up to date and follows-up with gynecology. She denies there are any urinary problems. She has never had a mammogram, but she performs regular breast exams. She has a family history of breast cancer in her maternal grandmother. She has never had a colonoscopy. She denies any family history of colon cancer. Her bowel movements are regular. She denies any difficulty swallowing.     Her dental and eye exams are up to date. Her immunizations are up to date. She wears a seatbelt and applies sunscreen. She denies any lower extremity edema. She denies any active concerns with her skin. The patient takes Valtrex for oral cold sores. She had a recurring boil that was under her left arm that has not recurred since 12/2022. She was referred to dermatology and then  plastics for possible surgery but abscess has improved.     I have reviewed the following portions of the patient's history and confirmed they are accurate: allergies, current medications, past family history, past medical history, past social history, past surgical history, and problem list.    I have personally completed the patient's review of systems.  Overall healthy:  Yes  Regular  exercise:  No  Diet is well balanced:  Improving  Vitamin Supplement:  No  Alcohol intake:  Moderate  Tobacco use:  No  Cardiovascular risk is low:  Low  Menstrual cycle regular:  No and IUD placed  PAP:  Up to date and Sees Gynecology  :  No urinary issues  Mammogram:  N/A and maternal grandmother , does self breast exams  Colonoscopy:  N/A and No FH  GI:  Normal BM  Regular dental exam:  Up to date  Regular eye exam:  Up to date  Immunizations up to date:  Up to date  Wear a seatbelt regularly:  Yes  Wear sunscreen regularly when outdoors:  Yes    Past Medical History:   Diagnosis Date    Anxiety      Past Surgical History:   Procedure Laterality Date    NO PAST SURGERIES       Family History   Problem Relation Age of Onset    Diabetes Mother         type 2    No Known Problems Father     No Known Problems Sister     No Known Problems Brother     Breast cancer Maternal Grandmother     Diabetes Maternal Grandmother     Breast cancer - additional onset Maternal Grandmother     Diabetes Paternal Grandfather      Social History     Tobacco Use   Smoking Status Former    Types: Electronic Cigarette, Cigarettes    Quit date: 12/14/2019    Years since quitting: 3.8   Smokeless Tobacco Never   Tobacco Comments    Pt states that she is now using an electronic cigarrette.      Social History     Substance and Sexual Activity   Alcohol Use Yes    Alcohol/week: 0.0 standard drinks of alcohol    Comment: socially      No Known Allergies    Review of Systems  Pertinent items are noted in HPI.     Current Outpatient Medications on File Prior to Visit   Medication Sig    cetirizine (zyrTEC) 10 MG tablet Take 1 tablet by mouth As Needed.    hydrOXYzine pamoate (VISTARIL) 50 MG capsule Take 1 capsule by mouth 3 (Three) Times a Day As Needed for Anxiety.    levonorgestrel (MIRENA) 20 MCG/24HR IUD 1 each by Intrauterine route 1 (One) Time.    Omega-3 Fatty Acids (FISH OIL PO) Take  by mouth.    valACYclovir (VALTREX) 1000 MG tablet  "As Needed.    [DISCONTINUED] escitalopram (Lexapro) 20 MG tablet Take 1 tablet by mouth Daily.    [DISCONTINUED] triamcinolone (KENALOG) 0.1 % cream 2 (Two) Times a Day.    [DISCONTINUED] zolpidem (Ambien) 5 MG tablet 1 p.o. at onset of sleep study may repeat in 1 hour if needed     No current facility-administered medications on file prior to visit.       Objective   Vitals:    10/17/23 0931   BP: 114/74   Pulse: 95   Resp: 16   Temp: 97.4 °F (36.3 °C)   TempSrc: Tympanic   SpO2: 98%   Weight: 73.9 kg (163 lb)   Height: 154.9 cm (61\")     Body mass index is 30.8 kg/m².    Physical Exam  Vitals reviewed.   Constitutional:       Appearance: Normal appearance. She is well-developed.   HENT:      Head: Normocephalic and atraumatic.      Right Ear: Hearing, tympanic membrane, ear canal and external ear normal.      Left Ear: Hearing, tympanic membrane, ear canal and external ear normal.      Nose: Nose normal.      Mouth/Throat:      Lips: Pink.      Mouth: Mucous membranes are moist.      Pharynx: Oropharynx is clear. Uvula midline.   Eyes:      General: Lids are normal.      Extraocular Movements: Extraocular movements intact.      Conjunctiva/sclera: Conjunctivae normal.      Pupils: Pupils are equal, round, and reactive to light.   Neck:      Thyroid: No thyromegaly.      Trachea: Trachea normal.   Cardiovascular:      Rate and Rhythm: Normal rate and regular rhythm.      Pulses:           Carotid pulses are 2+ on the right side and 2+ on the left side.     Heart sounds: Normal heart sounds.   Pulmonary:      Effort: Pulmonary effort is normal. No respiratory distress.      Breath sounds: Normal breath sounds.   Abdominal:      General: Bowel sounds are normal.      Palpations: Abdomen is soft.      Tenderness: There is no abdominal tenderness.   Skin:     General: Skin is warm and dry.      Capillary Refill: Capillary refill takes 2 to 3 seconds.   Neurological:      Mental Status: She is alert and oriented to " person, place, and time.      GCS: GCS eye subscore is 4. GCS verbal subscore is 5. GCS motor subscore is 6.   Psychiatric:         Attention and Perception: Attention normal.         Mood and Affect: Mood normal.         Speech: Speech normal.         Behavior: Behavior normal. Behavior is cooperative.         Thought Content: Thought content normal.                  Assessment & Plan     Current Outpatient Medications:     cetirizine (zyrTEC) 10 MG tablet, Take 1 tablet by mouth As Needed., Disp: , Rfl:     escitalopram (Lexapro) 20 MG tablet, Take 1 tablet by mouth Daily., Disp: 90 tablet, Rfl: 3    hydrOXYzine pamoate (VISTARIL) 50 MG capsule, Take 1 capsule by mouth 3 (Three) Times a Day As Needed for Anxiety., Disp: 60 capsule, Rfl: 2    levonorgestrel (MIRENA) 20 MCG/24HR IUD, 1 each by Intrauterine route 1 (One) Time., Disp: , Rfl:     Omega-3 Fatty Acids (FISH OIL PO), Take  by mouth., Disp: , Rfl:     valACYclovir (VALTREX) 1000 MG tablet, As Needed., Disp: , Rfl:     Problem List Items Addressed This Visit       Anxiety    Relevant Medications    escitalopram (Lexapro) 20 MG tablet     Other Visit Diagnoses       Wellness examination    -  Primary    Relevant Orders    CBC (No Diff)    Comprehensive Metabolic Panel    Lipid Panel    Hemoglobin A1c    TSH Rfx On Abnormal To Free T4    Vitamin B12 & Folate    Vitamin D,25-Hydroxy    Screening for blood disease        Relevant Orders    CBC (No Diff)    Comprehensive Metabolic Panel    Lipid Panel    Hemoglobin A1c    TSH Rfx On Abnormal To Free T4    Vitamin B12 & Folate    Vitamin D,25-Hydroxy    Thyroid disorder screening        Relevant Orders    TSH Rfx On Abnormal To Free T4    Lipid screening        Relevant Orders    Lipid Panel    Encounter for vitamin deficiency screening        Relevant Orders    Vitamin B12 & Folate    Vitamin D,25-Hydroxy    Screening for diabetes mellitus        Relevant Orders    Hemoglobin A1c    Encounter for hepatitis C  screening test for low risk patient              1. Wellness exam.  - Patient will continue to eat a well-balanced diet, drink adequate amount of water, exercise at least 3 times weekly, and get adequate rest.  Suggested a multivitamin daily.  Discussed future preventative screenings and immunizations.    2. Anxiety.  - Chronic, stable.  - Continue Lexapro and hydroxyzine.           Counseling was given to patient for the following topics: appropriate exercise, healthy eating habits, disease prevention, risk factors for cancer, importance of self breast exam and breast health, importance of immunizations, including risks and benefits, bone health, sun safety, seatbelt use, and safe driving.      Plan of care reviewed with the patient at the conclusion of today's visit.  Education was provided regarding diagnosis, management, and any prescribed or recommended OTC medications.  Patient verbalized understanding of and agreement with management plan.     Return in about 1 year (around 10/17/2024), or if symptoms worsen or fail to improve, for Follow-up.      Transcribed from ambient dictation for JEREMY Hirsch by Hiwot Dumont.  10/17/23   09:47 EDT    Patient or patient representative verbalized consent to the visit recording.  I have personally performed the services described in this document as transcribed by the above individual, and it is both accurate and complete.

## 2023-10-18 LAB
ALBUMIN SERPL-MCNC: 4 G/DL (ref 3.5–5.2)
ALBUMIN/GLOB SERPL: 1.4 G/DL
ALP SERPL-CCNC: 81 U/L (ref 39–117)
ALT SERPL W P-5'-P-CCNC: 26 U/L (ref 1–33)
ANION GAP SERPL CALCULATED.3IONS-SCNC: 8 MMOL/L (ref 5–15)
AST SERPL-CCNC: 19 U/L (ref 1–32)
BILIRUB SERPL-MCNC: 0.5 MG/DL (ref 0–1.2)
BUN SERPL-MCNC: 11 MG/DL (ref 6–20)
BUN/CREAT SERPL: 14.9 (ref 7–25)
CALCIUM SPEC-SCNC: 9.3 MG/DL (ref 8.6–10.5)
CHLORIDE SERPL-SCNC: 107 MMOL/L (ref 98–107)
CHOLEST SERPL-MCNC: 175 MG/DL (ref 0–200)
CO2 SERPL-SCNC: 25 MMOL/L (ref 22–29)
CREAT SERPL-MCNC: 0.74 MG/DL (ref 0.57–1)
EGFRCR SERPLBLD CKD-EPI 2021: 107.7 ML/MIN/1.73
GLOBULIN UR ELPH-MCNC: 2.9 GM/DL
GLUCOSE SERPL-MCNC: 76 MG/DL (ref 65–99)
HBA1C MFR BLD: 5 % (ref 4.8–5.6)
HDLC SERPL-MCNC: 39 MG/DL (ref 40–60)
LDLC SERPL CALC-MCNC: 123 MG/DL (ref 0–100)
LDLC/HDLC SERPL: 3.12 {RATIO}
POTASSIUM SERPL-SCNC: 4 MMOL/L (ref 3.5–5.2)
PROT SERPL-MCNC: 6.9 G/DL (ref 6–8.5)
SODIUM SERPL-SCNC: 140 MMOL/L (ref 136–145)
TRIGL SERPL-MCNC: 71 MG/DL (ref 0–150)
TSH SERPL DL<=0.05 MIU/L-ACNC: 1.88 UIU/ML (ref 0.27–4.2)
VLDLC SERPL-MCNC: 13 MG/DL (ref 5–40)

## 2023-11-14 DIAGNOSIS — M25.521 RIGHT ELBOW PAIN: Primary | ICD-10-CM

## 2023-12-01 ENCOUNTER — HOSPITAL ENCOUNTER (OUTPATIENT)
Dept: GENERAL RADIOLOGY | Facility: HOSPITAL | Age: 38
Discharge: HOME | End: 2023-12-01
Admitting: NURSE PRACTITIONER
Payer: COMMERCIAL

## 2023-12-01 DIAGNOSIS — M25.521 RIGHT ELBOW PAIN: ICD-10-CM

## 2023-12-01 PROCEDURE — 73070 X-RAY EXAM OF ELBOW: CPT

## 2023-12-05 DIAGNOSIS — M25.521 RIGHT ELBOW PAIN: Primary | ICD-10-CM

## 2023-12-06 ENCOUNTER — OFFICE VISIT (OUTPATIENT)
Age: 38
End: 2023-12-06
Payer: COMMERCIAL

## 2023-12-06 VITALS
HEIGHT: 61 IN | WEIGHT: 160.5 LBS | BODY MASS INDEX: 30.3 KG/M2 | SYSTOLIC BLOOD PRESSURE: 116 MMHG | DIASTOLIC BLOOD PRESSURE: 72 MMHG

## 2023-12-06 DIAGNOSIS — S46.211A STRAIN OF RIGHT BICEPS TENDON: Primary | ICD-10-CM

## 2023-12-06 NOTE — PROGRESS NOTES
[facility] Office Visit     Office Visit       Date: 12/06/2023   Patient Name: Shannon Baum  MRN: [id number]  YOB: 1985    Referring Physician: Jake Montemayor*     Chief Complaint:   Chief Complaint   Patient presents with    Right Elbow - Pain       History of Present Illness:   Shannon Baum is a 38 y.o. female who presents with new problem of: right elbow pain.  Onset: atraumatic and gradual in nature. The issue has been ongoing for 3 month(s). Pain is a 5/10 on the pain scale. Pain is described as aching. Associated symptoms include pain, stiffness, and giving way/buckling. The pain is worse with any movement of the joint; resting improve the pain. Previous treatments have included: nothing.    Subjective   Review of Systems: Review of Systems   Constitutional:  Negative for chills, fever, unexpected weight gain and unexpected weight loss.   HENT:  Negative for congestion, postnasal drip and rhinorrhea.    Eyes:  Negative for blurred vision.   Respiratory:  Negative for shortness of breath.    Cardiovascular:  Negative for leg swelling.   Gastrointestinal:  Negative for abdominal pain, nausea and vomiting.   Genitourinary:  Negative for difficulty urinating.   Musculoskeletal:  Positive for arthralgias. Negative for gait problem, joint swelling and myalgias.   Skin:  Negative for skin lesions and wound.   Neurological:  Negative for dizziness, weakness, light-headedness and numbness.   Hematological:  Does not bruise/bleed easily.   Psychiatric/Behavioral:  Negative for depressed mood.         I have reviewed the following portions of the patient's history:History of Present Illness and review of systems.    Past Medical History:   Past Medical History:   Diagnosis Date    Anxiety        Past Surgical History:   Past Surgical History:   Procedure Laterality Date    NO PAST SURGERIES         Family History:   Family History   Problem  "Relation Age of Onset    Diabetes Mother         type 2    No Known Problems Father     No Known Problems Sister     No Known Problems Brother     Breast cancer Maternal Grandmother     Diabetes Maternal Grandmother     Breast cancer - additional onset Maternal Grandmother     Diabetes Paternal Grandfather        Social History:   Social History     Socioeconomic History    Marital status:    Tobacco Use    Smoking status: Former     Types: Cigarettes     Quit date: 12/14/2019     Years since quitting: 3.9    Smokeless tobacco: Never    Tobacco comments:     Pt states that she is now using an electronic cigarrette.   Vaping Use    Vaping Use: Never used   Substance and Sexual Activity    Alcohol use: Yes     Alcohol/week: 0.0 standard drinks of alcohol     Comment: socially    Drug use: Never    Sexual activity: Yes     Partners: Male     Birth control/protection: I.U.D.       Medications:   Current Outpatient Medications:     cetirizine (zyrTEC) 10 MG tablet, Take 1 tablet by mouth As Needed., Disp: , Rfl:     escitalopram (Lexapro) 20 MG tablet, Take 1 tablet by mouth Daily., Disp: 90 tablet, Rfl: 3    hydrOXYzine pamoate (VISTARIL) 50 MG capsule, Take 1 capsule by mouth 3 (Three) Times a Day As Needed for Anxiety., Disp: 60 capsule, Rfl: 2    levonorgestrel (MIRENA) 20 MCG/24HR IUD, 1 each by Intrauterine route 1 (One) Time., Disp: , Rfl:     Omega-3 Fatty Acids (FISH OIL PO), Take  by mouth., Disp: , Rfl:     valACYclovir (VALTREX) 1000 MG tablet, As Needed., Disp: , Rfl:     Allergies: No Known Allergies    I reviewed the patient's chief complaint, history of present illness, review of systems, past medical history, surgical history, family history, social history, medications and allergy list.     Objective    Vital Signs:   Vitals:    12/06/23 0823   BP: 116/72   Weight: 72.8 kg (160 lb 8 oz)   Height: 154.9 cm (61\")     Body mass index is 30.33 kg/m². BMI is >= 30 and <35. (Class 1 Obesity). The " following options were offered after discussion;: exercise counseling/recommendations and nutrition counseling/recommendations     Patient reports that she is a former smoker. She quit smoking in 2019.  She has not resumed smoking since that time.  This behavior was applauded and she was encouraged to continue in smoking cessation.  We will continue to monitor at subsequent visits.     Ortho Exam:  Right elbow: No erythema ecchymosis or swelling.  She is tender at the distal biceps tendon.  Negative hook test.  Pain with resisted elbow flexion.  5/5 strength but with pain.  Full range of motion in flexion extension of the elbow.  No pain with resisted extension of the elbow.  Sensation intact to light touch.  2+ radial pulse.  Normal    Results Review:   Imaging Results (Last 24 Hours)       ** No results found for the last 24 hours. **        I personally reviewed the radiographs of the right elbow obtained on 12/1/2023.  No acute fracture or dislocation.  No effusion noted.  No enthesophytes.    Procedures    Assessment / Plan    Assessment/Plan:   Diagnoses and all orders for this visit:    1. Strain of right biceps tendon (Primary)  -     Ambulatory Referral to Physical Therapy Evaluate and treat, Ortho; Electrotherapy, Heat; Moist heat, Phonophoresis, Ultrasound; Tens (Home), Iontophoresis, E-stim; Cross Fiber, Soft Tissue Mobilizaton, Desensitization; Stretching, ROM, Strengtheni...    Right elbow pain ongoing for the last 3 months primarily associated with flexion.  She localizes pain over the distal biceps tendon.  Radiographs of the right elbow do not show any acute or degenerative process.  History exam is consistent with a strain of the right biceps tendon versus distal biceps tendinitis.  No evidence or mechanism to think ruptured.  We discussed purpose of the biceps tendon and how it assist the elbow.  I recommended that she modify activity to limit use at this time until we get the pain calm down.  I  will get her into physical therapy and provide her with a referral today.  I recommended ibuprofen to calm down day-to-day pain.  I would not restrict her in any activities but have advised her to exercise caution.  I will see her back in 1 month to monitor her response.    Previous imaging studies reviewed: 12/1/2023-radiographs of the right elbow.    Previous laboratory results reviewed: 10/17/2023-hemoglobin A1c 5.00%.  TSH 1.880.    Follow Up:   Return in about 1 month (around 1/6/2024) for Recheck.      Rich Locke MD  [facility]

## 2023-12-14 ENCOUNTER — TREATMENT (OUTPATIENT)
Dept: PHYSICAL THERAPY | Facility: CLINIC | Age: 38
End: 2023-12-14
Payer: COMMERCIAL

## 2023-12-14 DIAGNOSIS — M25.521 RIGHT ELBOW PAIN: Primary | ICD-10-CM

## 2023-12-14 PROCEDURE — 97161 PT EVAL LOW COMPLEX 20 MIN: CPT | Performed by: PHYSICAL THERAPIST

## 2023-12-14 PROCEDURE — 97140 MANUAL THERAPY 1/> REGIONS: CPT | Performed by: PHYSICAL THERAPIST

## 2023-12-14 PROCEDURE — 97110 THERAPEUTIC EXERCISES: CPT | Performed by: PHYSICAL THERAPIST

## 2023-12-14 NOTE — PROGRESS NOTES
Physical Therapy Initial Evaluation and Plan of Care    [facility] [place]  [address] [address] [phone]    Patient: Shannon Baum   : 1985  Diagnosis/ICD-10 Code:  Right elbow pain [M25.521]  Referring practitioner: Leodan Locke MD    Subjective Evaluation    History of Present Illness  Date of onset: 2023  Mechanism of injury: Chronic    Subjective comment: Started having right anterior elbow pain after lifting a bag of groceries in mid September.  Pain has not improved since then.  No numbness or tingling in the arm or hand.  No neck or shoulder pain.  No sleep disturbance.  Patient Occupation: Medical technician/sales Quality of life: excellent    Pain  Exacerbated by: Closing tailgate on truck; pushing medical carts at work; lifting heavy objects.  Progression: no change    Social Support  Lives with: spouse and young children (Has 8, 10 and 12-year-old boys.)    Hand dominance: right    Patient Goals  Patient goals for therapy: decreased pain and increased strength             Objective          Active Range of Motion     Left Elbow   Flexion: 135 degrees   Extension: 0 degrees   Forearm supination: 80 degrees   Forearm pronation: 90 degrees     Right Elbow   Flexion: 133 degrees   Extension: 0 degrees   Forearm supination: 80 degrees   Forearm pronation: 90 degrees     Strength/Myotome Testing     Right Elbow   Flexion: 4+  Extension: 4+  Forearm supination: 5  Forearm pronation: 5    Additional Strength Details  *Power : Right-> 45 pounds; L-> 60    Tests     Additional Tests Details  *(-) Hook test     General Comments     Elbow Comments   *(+) Tenderness antebrachial and antecubital regions of the right arm  *(+) Reproduction of pain with elbow flexion in pronated position         Assessment & Plan       Assessment  Impairments: abnormal or restricted ROM, activity intolerance, impaired physical strength, lacks  appropriate home exercise program and pain with function   Functional limitations: carrying objects, lifting, pulling and pushing   Assessment details: Mrs. Baum is a very pleasant 38-year-old right-hand-dominant female that presents to physical therapy with right anterior elbow pain starting in September 2023.  This is related to a lifting injury.  Past medical history was covered and reviewed during interview.  Gross elbow range of motion is within normal limits.  Has good force output of the right elbow musculature localized tenderness noted along the anterior humeral and antecubital fossa regions.  No signs or symptoms of distal bicep tendon rupture.  Has signs and symptoms consistent with tendinopathy affecting the anterior elbow.  Will provide a low load isometric program to reduce elbow sensitivity.  Combined with a graded loading program.  Prognosis: good    Goals  Plan Goals: Short-term goals:  1.) QuickDASH improved by 1 MCID in 4 weeks.  2.)  Have at least 60 pounds of power  strength without pain in 4 weeks.  Long-term goals:  1.)  Have 5 out of 5 elbow flexion strength without pain in 6 weeks.  2.)  Build to perform heavy lifting tasks (I.e. ADLs and work-related tasks) in 8 weeks.    Plan  Therapy options: will be seen for skilled therapy services  Planned modality interventions: thermotherapy (hydrocollator packs), cryotherapy and high voltage pulsed current (pain management)  Planned therapy interventions: therapeutic activities, stretching, strengthening, manual therapy, abdominal trunk stabilization, home exercise program and functional ROM exercises  Frequency: 1x week  Duration in visits: 8  Duration in weeks: 8  Treatment plan discussed with: patient        Manual Therapy:    15     mins  52652;  Therapeutic Exercise:    10     mins  29785;     Neuromuscular Alva:        mins  90312;    Therapeutic Activity:          mins  71385;     Gait Training:           mins  73503;     Ultrasound:           mins  50456;    Electrical Stimulation:         mins  61394 ( );  Dry Needling          mins self-pay    Timed Treatment:   25   mins   Total Treatment:     60   mins    PT SIGNATURE: Paul Joiner, MILAGROS   DATE TREATMENT INITIATED: 12/14/2023    Initial Certification  Certification Period: 3/13/2024  I certify that the therapy services are furnished while this patient is under my care.  The services outlined above are required by this patient, and will be reviewed every 90 days.     PHYSICIAN: Leodan Locke MD  NPI: [id number]                                      DATE:    Please sign and return via fax to [phone].. Thank you, [facility].

## 2024-05-31 ENCOUNTER — OFFICE VISIT (OUTPATIENT)
Dept: INTERNAL MEDICINE | Facility: CLINIC | Age: 39
End: 2024-05-31
Payer: COMMERCIAL

## 2024-05-31 VITALS
RESPIRATION RATE: 18 BRPM | TEMPERATURE: 97.5 F | SYSTOLIC BLOOD PRESSURE: 108 MMHG | WEIGHT: 165 LBS | BODY MASS INDEX: 31.15 KG/M2 | OXYGEN SATURATION: 97 % | HEIGHT: 61 IN | DIASTOLIC BLOOD PRESSURE: 72 MMHG | HEART RATE: 76 BPM

## 2024-05-31 DIAGNOSIS — E66.9 CLASS 1 OBESITY WITH SERIOUS COMORBIDITY AND BODY MASS INDEX (BMI) OF 31.0 TO 31.9 IN ADULT, UNSPECIFIED OBESITY TYPE: ICD-10-CM

## 2024-05-31 DIAGNOSIS — N39.46 MIXED STRESS AND URGE INCONTINENCE: ICD-10-CM

## 2024-05-31 DIAGNOSIS — N81.89 PELVIC FLOOR WEAKNESS: ICD-10-CM

## 2024-05-31 DIAGNOSIS — F41.9 ANXIETY: ICD-10-CM

## 2024-05-31 DIAGNOSIS — N39.46 MIXED STRESS AND URGE INCONTINENCE: Primary | ICD-10-CM

## 2024-05-31 DIAGNOSIS — R39.15 URINARY URGENCY: ICD-10-CM

## 2024-05-31 LAB
BILIRUB BLD-MCNC: NEGATIVE MG/DL
CLARITY, POC: CLEAR
COLOR UR: YELLOW
EXPIRATION DATE: NORMAL
GLUCOSE UR STRIP-MCNC: NEGATIVE MG/DL
KETONES UR QL: NEGATIVE
LEUKOCYTE EST, POC: NEGATIVE
Lab: NORMAL
NITRITE UR-MCNC: NEGATIVE MG/ML
PH UR: 6 [PH] (ref 5–8)
PROT UR STRIP-MCNC: NEGATIVE MG/DL
RBC # UR STRIP: NEGATIVE /UL
SP GR UR: 1.01 (ref 1–1.03)
UROBILINOGEN UR QL: NORMAL

## 2024-05-31 PROCEDURE — 81003 URINALYSIS AUTO W/O SCOPE: CPT | Performed by: NURSE PRACTITIONER

## 2024-05-31 PROCEDURE — 99214 OFFICE O/P EST MOD 30 MIN: CPT | Performed by: NURSE PRACTITIONER

## 2024-05-31 RX ORDER — OXYBUTYNIN CHLORIDE 10 MG/1
10 TABLET, EXTENDED RELEASE ORAL DAILY
Qty: 90 TABLET | Refills: 0 | Status: SHIPPED | OUTPATIENT
Start: 2024-05-31

## 2024-05-31 RX ORDER — PHENTERMINE HYDROCHLORIDE 37.5 MG/1
37.5 TABLET ORAL
Qty: 30 TABLET | Refills: 0 | Status: SHIPPED | OUTPATIENT
Start: 2024-05-31

## 2024-05-31 RX ORDER — OXYBUTYNIN CHLORIDE 10 MG/1
10 TABLET, EXTENDED RELEASE ORAL DAILY
Qty: 30 TABLET | Refills: 1 | Status: SHIPPED | OUTPATIENT
Start: 2024-05-31 | End: 2024-05-31

## 2024-05-31 NOTE — PROGRESS NOTES
Subjective   Chief Complaint   Patient presents with    Urinary Urgency     x2yr    Weight Gain      Shannon Baum is a 38 y.o. female.     The patient is here today for .  The patient is a 38-year-old female who presents for a follow-up.    The patient has been experiencing urinary issues for an extended period; however, she has recently been having trouble holding her urine. She denies experiencing dysuria or urinary frequency. Despite increasing her water intake, she experiences urinary urgency, necessitating clenching her legs by the time she reaches the restroom. These symptoms occur daily, even during car rides, and she occasionally experiences urinary incontinence. She is open to trying pelvic floor therapy. She has not undergone a hysterectomy. She had four children vaginally.     The patient has been on Lexapro for a significant period, which she finds beneficial. However, she has been diligently engaging in physical exercise and maintaining a healthy diet since the beginning of the year, but there has been no improvement in her weight loss. She reports an improvement in her endurance but expresses frustration with the lack of progress. She is concerned about potential weight gain as a side effect of Lexapro and is considering removing her Mirena IUD for birth control.  I have reviewed the following portions of the patient's history and confirmed they are accurate: allergies, current medications, past family history, past medical history, past social history, past surgical history, and problem list    Review of Systems  Pertinent items are noted in HPI.     Current Outpatient Medications on File Prior to Visit   Medication Sig    cetirizine (zyrTEC) 10 MG tablet Take 1 tablet by mouth As Needed.    escitalopram (Lexapro) 20 MG tablet Take 1 tablet by mouth Daily.    hydrOXYzine pamoate (VISTARIL) 50 MG capsule Take 1 capsule by mouth 3 (Three) Times a Day As Needed for Anxiety.    levonorgestrel (MIRENA) 20  "MCG/24HR IUD 1 each by Intrauterine route 1 (One) Time.    Omega-3 Fatty Acids (FISH OIL PO) Take  by mouth.    valACYclovir (VALTREX) 1000 MG tablet As Needed.     No current facility-administered medications on file prior to visit.       Objective   Vitals:    05/31/24 1113   BP: 108/72   BP Location: Left arm   Patient Position: Sitting   Cuff Size: Adult   Pulse: 76   Resp: 18   Temp: 97.5 °F (36.4 °C)   TempSrc: Infrared   SpO2: 97%   Weight: 74.8 kg (165 lb)   Height: 154.9 cm (61\")   PainSc: 0-No pain     Body mass index is 31.18 kg/m².    Physical Exam  Vitals reviewed.   Constitutional:       Appearance: Normal appearance. She is well-developed.   HENT:      Head: Normocephalic and atraumatic.      Nose: Nose normal.   Eyes:      General: Lids are normal.      Conjunctiva/sclera: Conjunctivae normal.      Pupils: Pupils are equal, round, and reactive to light.   Neck:      Thyroid: No thyromegaly.      Trachea: Trachea normal.   Cardiovascular:      Rate and Rhythm: Normal rate and regular rhythm.      Heart sounds: Normal heart sounds.   Pulmonary:      Effort: Pulmonary effort is normal. No respiratory distress.      Breath sounds: Normal breath sounds.   Skin:     General: Skin is warm and dry.   Neurological:      Mental Status: She is alert and oriented to person, place, and time.      GCS: GCS eye subscore is 4. GCS verbal subscore is 5. GCS motor subscore is 6.   Psychiatric:         Attention and Perception: Attention normal.         Mood and Affect: Mood normal.         Speech: Speech normal.         Behavior: Behavior normal. Behavior is cooperative.         Thought Content: Thought content normal.         Assessment & Plan   Problem List Items Addressed This Visit       Anxiety     Other Visit Diagnoses       Mixed stress and urge incontinence    -  Primary    Relevant Orders    Ambulatory Referral to Physical Therapy for Evaluation & Treatment    Class 1 obesity with serious comorbidity and body " mass index (BMI) of 31.0 to 31.9 in adult, unspecified obesity type        Relevant Medications    phentermine (Adipex-P) 37.5 MG tablet    Pelvic floor weakness        Relevant Orders    Ambulatory Referral to Physical Therapy for Evaluation & Treatment    Urinary urgency        Relevant Orders    POCT urinalysis dipstick, automated (Completed)         Mixed stress and urge incontinence accompanied by pelvic floor weakness - New and unstable.  The patient's urinalysis results are normal.   The patient will commence a regimen of oxybutynin.   A referral for pelvic floor physical therapy will be made.   A referral to a urologist will be considered if necessary.       Class 1 obesity - Chronic and unstable.  The patient will begin treatment with Adipex.   Patient was educated on side effects of taking Adipex medications including risk of addiction, increased heart rate, blood pressure, anxiety, insomnia, and dry mouth. Patient verbalized understanding and wants to continue with this medication. Patient will stop medication and schedule earlier follow up if these symptoms occur. Patient will increase water intake, follow a well balanced diet low in cholesterol/carbs/sugars, decrease portion sizes, and increase physical activity.     Anxiety - Chronic and stable.  The patient will continue her current regimen of Lexapro and hydroxyzine.    Follow-up  The patient has been educated about the potential for significant dry mouth and dehydration associated with taking oxybutynin, Adipex, and hydroxyzine, and advised to maintain adequate water intake.   The patient is advised to follow up if any issues arise.        Current Outpatient Medications:     cetirizine (zyrTEC) 10 MG tablet, Take 1 tablet by mouth As Needed., Disp: , Rfl:     escitalopram (Lexapro) 20 MG tablet, Take 1 tablet by mouth Daily., Disp: 90 tablet, Rfl: 3    hydrOXYzine pamoate (VISTARIL) 50 MG capsule, Take 1 capsule by mouth 3 (Three) Times a Day As  Needed for Anxiety., Disp: 60 capsule, Rfl: 2    levonorgestrel (MIRENA) 20 MCG/24HR IUD, 1 each by Intrauterine route 1 (One) Time., Disp: , Rfl:     Omega-3 Fatty Acids (FISH OIL PO), Take  by mouth., Disp: , Rfl:     valACYclovir (VALTREX) 1000 MG tablet, As Needed., Disp: , Rfl:     oxybutynin XL (DITROPAN-XL) 10 MG 24 hr tablet, TAKE 1 TABLET BY MOUTH DAILY, Disp: 90 tablet, Rfl: 0    phentermine (Adipex-P) 37.5 MG tablet, Take 1 tablet by mouth Every Morning Before Breakfast., Disp: 30 tablet, Rfl: 0       Plan of care reviewed with the patient at the conclusion of today's visit.  Education was provided regarding diagnosis, management, and any prescribed or recommended OTC medications.  Patient verbalized understanding of and agreement with management plan.     Return in about 1 month (around 6/30/2024), or if symptoms worsen or fail to improve, for Follow-up.      Transcribed from ambient dictation for JEREMY Hirsch by JEREMY Hirsch.  05/31/24   11:50 EDT    Patient or patient representative verbalized consent to the visit recording.  I have personally performed the services described in this document as transcribed by the above individual, and it is both accurate and complete.      Transcribed from ambient dictation for JEREMY Hirsch by Kristy Macias.  05/31/24   13:54 EDT    Patient or patient representative verbalized consent to the visit recording.  I have personally performed the services described in this document as transcribed by the above individual, and it is both accurate and complete.

## 2024-07-02 ENCOUNTER — TELEMEDICINE (OUTPATIENT)
Dept: INTERNAL MEDICINE | Facility: CLINIC | Age: 39
End: 2024-07-02
Payer: COMMERCIAL

## 2024-07-02 VITALS
DIASTOLIC BLOOD PRESSURE: 72 MMHG | SYSTOLIC BLOOD PRESSURE: 110 MMHG | BODY MASS INDEX: 29.64 KG/M2 | HEIGHT: 61 IN | WEIGHT: 157 LBS | HEART RATE: 64 BPM

## 2024-07-02 DIAGNOSIS — F41.9 ANXIETY: ICD-10-CM

## 2024-07-02 DIAGNOSIS — N39.46 MIXED STRESS AND URGE INCONTINENCE: ICD-10-CM

## 2024-07-02 DIAGNOSIS — E66.3 OVERWEIGHT WITH BODY MASS INDEX (BMI) OF 29 TO 29.9 IN ADULT: Primary | ICD-10-CM

## 2024-07-02 PROCEDURE — 99214 OFFICE O/P EST MOD 30 MIN: CPT | Performed by: NURSE PRACTITIONER

## 2024-07-02 RX ORDER — ESCITALOPRAM OXALATE 20 MG/1
20 TABLET ORAL DAILY
Qty: 90 TABLET | Refills: 3 | Status: SHIPPED | OUTPATIENT
Start: 2024-07-02

## 2024-07-02 RX ORDER — PHENTERMINE HYDROCHLORIDE 37.5 MG/1
37.5 TABLET ORAL
Qty: 30 TABLET | Refills: 1 | Status: SHIPPED | OUTPATIENT
Start: 2024-07-02

## 2024-07-02 NOTE — PROGRESS NOTES
"Subjective   Chief Complaint   Patient presents with    Obesity    Anxiety    Urinary Incontinence      Shannon Baum is a 38 y.o. female.     History of Present Illness      I have reviewed the following portions of the patient's history and confirmed they are accurate: allergies, current medications, past family history, past medical history, past social history, past surgical history, and problem list    Review of Systems  Pertinent items are noted in HPI.     Current Outpatient Medications on File Prior to Visit   Medication Sig    cetirizine (zyrTEC) 10 MG tablet Take 1 tablet by mouth As Needed.    hydrOXYzine pamoate (VISTARIL) 50 MG capsule Take 1 capsule by mouth 3 (Three) Times a Day As Needed for Anxiety.    levonorgestrel (MIRENA) 20 MCG/24HR IUD 1 each by Intrauterine route 1 (One) Time.    Omega-3 Fatty Acids (FISH OIL PO) Take  by mouth.    oxybutynin XL (DITROPAN-XL) 10 MG 24 hr tablet TAKE 1 TABLET BY MOUTH DAILY    valACYclovir (VALTREX) 1000 MG tablet As Needed.    [DISCONTINUED] escitalopram (Lexapro) 20 MG tablet Take 1 tablet by mouth Daily.    [DISCONTINUED] phentermine (Adipex-P) 37.5 MG tablet Take 1 tablet by mouth Every Morning Before Breakfast.     No current facility-administered medications on file prior to visit.       Objective   Vitals:    07/02/24 0821   BP: 110/72   Pulse: 64   Weight: 71.2 kg (157 lb)   Height: 154.9 cm (61\")     Body mass index is 29.66 kg/m².    Physical Exam  Constitutional:       Appearance: Normal appearance. She is well-developed.   HENT:      Head: Normocephalic and atraumatic.      Nose: Nose normal.   Eyes:      General: Lids are normal.      Conjunctiva/sclera: Conjunctivae normal.   Neck:      Trachea: Trachea normal.   Pulmonary:      Effort: No respiratory distress.   Neurological:      Mental Status: She is alert and oriented to person, place, and time.      GCS: GCS eye subscore is 4. GCS verbal subscore is 5. GCS motor subscore is 6.   Psychiatric: "         Attention and Perception: Attention normal.         Mood and Affect: Mood normal.         Speech: Speech normal.         Behavior: Behavior normal. Behavior is cooperative.         Thought Content: Thought content normal.         Results      Assessment & Plan   Problem List Items Addressed This Visit       Anxiety    Relevant Medications    escitalopram (Lexapro) 20 MG tablet     Other Visit Diagnoses       Overweight with body mass index (BMI) of 29 to 29.9 in adult    -  Primary    Relevant Medications    phentermine (Adipex-P) 37.5 MG tablet    Mixed stress and urge incontinence                   Current Outpatient Medications:     escitalopram (Lexapro) 20 MG tablet, Take 1 tablet by mouth Daily., Disp: 90 tablet, Rfl: 3    phentermine (Adipex-P) 37.5 MG tablet, Take 1 tablet by mouth Every Morning Before Breakfast., Disp: 30 tablet, Rfl: 1    cetirizine (zyrTEC) 10 MG tablet, Take 1 tablet by mouth As Needed., Disp: , Rfl:     hydrOXYzine pamoate (VISTARIL) 50 MG capsule, Take 1 capsule by mouth 3 (Three) Times a Day As Needed for Anxiety., Disp: 60 capsule, Rfl: 2    levonorgestrel (MIRENA) 20 MCG/24HR IUD, 1 each by Intrauterine route 1 (One) Time., Disp: , Rfl:     Omega-3 Fatty Acids (FISH OIL PO), Take  by mouth., Disp: , Rfl:     oxybutynin XL (DITROPAN-XL) 10 MG 24 hr tablet, TAKE 1 TABLET BY MOUTH DAILY, Disp: 90 tablet, Rfl: 0    valACYclovir (VALTREX) 1000 MG tablet, As Needed., Disp: , Rfl:     Assessment & Plan           Plan of care reviewed with the patient at the conclusion of today's visit.  Education was provided regarding diagnosis, management, and any prescribed or recommended OTC medications.  Patient verbalized understanding of and agreement with management plan.     No follow-ups on file.      Transcribed from ambient dictation for JEREMY Hirsch by JEREMY Hirsch.  07/02/24   08:17 EDT    {LAUREN [employer] Provider Statement:[id number]}

## 2024-07-22 NOTE — PROGRESS NOTES
"Subjective   Chief Complaint   Patient presents with    Obesity    Anxiety    Urinary Incontinence      This is a video visit.  You have chosen to receive care through a video visit today. Do you consent to use a video visit for your medical care today? Yes    Shannon Baum is a 39 y.o. female.    History of Present Illness  Patient to follow up on obesity, anxiety, and urinary incontinence.     I have reviewed the following portions of the patient's history and confirmed they are accurate: allergies, current medications, past family history, past medical history, past social history, past surgical history, and problem list    Review of Systems  Pertinent items are noted in HPI.     Current Outpatient Medications on File Prior to Visit   Medication Sig    cetirizine (zyrTEC) 10 MG tablet Take 1 tablet by mouth As Needed.    hydrOXYzine pamoate (VISTARIL) 50 MG capsule Take 1 capsule by mouth 3 (Three) Times a Day As Needed for Anxiety.    levonorgestrel (MIRENA) 20 MCG/24HR IUD 1 each by Intrauterine route 1 (One) Time.    Omega-3 Fatty Acids (FISH OIL PO) Take  by mouth.    oxybutynin XL (DITROPAN-XL) 10 MG 24 hr tablet TAKE 1 TABLET BY MOUTH DAILY    valACYclovir (VALTREX) 1000 MG tablet As Needed.     No current facility-administered medications on file prior to visit.       Objective   Vitals:    07/02/24 0821   BP: 110/72   Pulse: 64   Weight: 71.2 kg (157 lb)   Height: 154.9 cm (61\")     Body mass index is 29.66 kg/m².    Physical Exam  Constitutional:       Appearance: Normal appearance. She is well-developed.   HENT:      Head: Normocephalic and atraumatic.      Nose: Nose normal.   Eyes:      General: Lids are normal.      Conjunctiva/sclera: Conjunctivae normal.   Neck:      Trachea: Trachea normal.   Pulmonary:      Effort: No respiratory distress.   Neurological:      Mental Status: She is alert and oriented to person, place, and time.      GCS: GCS eye subscore is 4. GCS verbal subscore is 5. GCS motor " subscore is 6.   Psychiatric:         Attention and Perception: Attention normal.         Mood and Affect: Mood normal.         Speech: Speech normal.         Behavior: Behavior normal. Behavior is cooperative.         Thought Content: Thought content normal.         Results      Assessment & Plan   Problem List Items Addressed This Visit       Anxiety    Relevant Medications    escitalopram (Lexapro) 20 MG tablet     Other Visit Diagnoses       Overweight with body mass index (BMI) of 29 to 29.9 in adult    -  Primary    Relevant Medications    phentermine (Adipex-P) 37.5 MG tablet    Mixed stress and urge incontinence                   Current Outpatient Medications:     escitalopram (Lexapro) 20 MG tablet, Take 1 tablet by mouth Daily., Disp: 90 tablet, Rfl: 3    phentermine (Adipex-P) 37.5 MG tablet, Take 1 tablet by mouth Every Morning Before Breakfast., Disp: 30 tablet, Rfl: 1    cetirizine (zyrTEC) 10 MG tablet, Take 1 tablet by mouth As Needed., Disp: , Rfl:     hydrOXYzine pamoate (VISTARIL) 50 MG capsule, Take 1 capsule by mouth 3 (Three) Times a Day As Needed for Anxiety., Disp: 60 capsule, Rfl: 2    levonorgestrel (MIRENA) 20 MCG/24HR IUD, 1 each by Intrauterine route 1 (One) Time., Disp: , Rfl:     Omega-3 Fatty Acids (FISH OIL PO), Take  by mouth., Disp: , Rfl:     oxybutynin XL (DITROPAN-XL) 10 MG 24 hr tablet, TAKE 1 TABLET BY MOUTH DAILY, Disp: 90 tablet, Rfl: 0    valACYclovir (VALTREX) 1000 MG tablet, As Needed., Disp: , Rfl:     Assessment & Plan           Plan of care reviewed with the patient at the conclusion of today's visit.  Education was provided regarding diagnosis, management, and any prescribed or recommended OTC medications.  Patient verbalized understanding of and agreement with management plan.     Return in about 2 months (around 9/2/2024), or if symptoms worsen or fail to improve.     [place] in [place]. I spent 25 minutes in medical discussion with patient  during this visit.     Transcribed from ambient dictation for JEREMY Hirsch by JEREMY Hirsch.  07/21/24   23:51 EDT

## 2024-09-25 DIAGNOSIS — N39.46 MIXED STRESS AND URGE INCONTINENCE: ICD-10-CM

## 2024-09-25 DIAGNOSIS — E66.3 OVERWEIGHT WITH BODY MASS INDEX (BMI) OF 29 TO 29.9 IN ADULT: ICD-10-CM

## 2024-09-25 RX ORDER — PHENTERMINE HYDROCHLORIDE 37.5 MG/1
37.5 TABLET ORAL
Qty: 30 TABLET | Refills: 1 | OUTPATIENT
Start: 2024-09-25

## 2024-09-25 RX ORDER — OXYBUTYNIN CHLORIDE 10 MG/1
10 TABLET, EXTENDED RELEASE ORAL DAILY
Qty: 90 TABLET | Refills: 0 | Status: SHIPPED | OUTPATIENT
Start: 2024-09-25

## 2024-10-31 DIAGNOSIS — F41.9 ANXIETY: ICD-10-CM

## 2024-10-31 DIAGNOSIS — N39.46 MIXED STRESS AND URGE INCONTINENCE: ICD-10-CM

## 2024-10-31 RX ORDER — OXYBUTYNIN CHLORIDE 10 MG/1
10 TABLET, EXTENDED RELEASE ORAL DAILY
Qty: 90 TABLET | Refills: 0 | Status: SHIPPED | OUTPATIENT
Start: 2024-10-31

## 2024-10-31 RX ORDER — ESCITALOPRAM OXALATE 20 MG/1
20 TABLET ORAL DAILY
Qty: 90 TABLET | Refills: 3 | Status: SHIPPED | OUTPATIENT
Start: 2024-10-31

## 2024-10-31 NOTE — TELEPHONE ENCOUNTER
Caller: Shannon Baum    Relationship: Self    Best call back number: [phone]     Requested Prescriptions:   Requested Prescriptions     Pending Prescriptions Disp Refills    oxybutynin XL (DITROPAN-XL) 10 MG 24 hr tablet 90 tablet 0     Sig: Take 1 tablet by mouth Daily.    escitalopram (Lexapro) 20 MG tablet 90 tablet 3     Sig: Take 1 tablet by mouth Daily.        Pharmacy where request should be sent: [facility] [id number] [address] AT [facility] [phone]  - [phone] FX     Last office visit with prescribing clinician: 5/31/2024   Last telemedicine visit with prescribing clinician: 7/2/2024   Next office visit with prescribing clinician: 11/8/2024     Additional details provided by patient:     Does the patient have less than a 3 day supply:  [x] Yes  [] No    Would you like a call back once the refill request has been completed: [] Yes [x] No    If the office needs to give you a call back, can they leave a voicemail: [] Yes [x] No    Tg Pagan Rep   10/31/24 15:37 EDT

## 2024-11-08 ENCOUNTER — OFFICE VISIT (OUTPATIENT)
Dept: INTERNAL MEDICINE | Facility: CLINIC | Age: 39
End: 2024-11-08
Payer: COMMERCIAL

## 2024-11-08 VITALS
SYSTOLIC BLOOD PRESSURE: 104 MMHG | TEMPERATURE: 97.3 F | OXYGEN SATURATION: 98 % | HEIGHT: 61 IN | DIASTOLIC BLOOD PRESSURE: 74 MMHG | HEART RATE: 82 BPM | WEIGHT: 150.8 LBS | BODY MASS INDEX: 28.47 KG/M2

## 2024-11-08 DIAGNOSIS — B00.9 HSV-1 INFECTION: ICD-10-CM

## 2024-11-08 DIAGNOSIS — Z13.0 SCREENING FOR BLOOD DISEASE: ICD-10-CM

## 2024-11-08 DIAGNOSIS — F51.01 PRIMARY INSOMNIA: Primary | ICD-10-CM

## 2024-11-08 DIAGNOSIS — Z13.21 ENCOUNTER FOR VITAMIN DEFICIENCY SCREENING: ICD-10-CM

## 2024-11-08 DIAGNOSIS — N39.46 MIXED STRESS AND URGE INCONTINENCE: ICD-10-CM

## 2024-11-08 DIAGNOSIS — F41.9 ANXIETY: ICD-10-CM

## 2024-11-08 DIAGNOSIS — Z13.220 LIPID SCREENING: ICD-10-CM

## 2024-11-08 DIAGNOSIS — Z13.1 SCREENING FOR DIABETES MELLITUS: ICD-10-CM

## 2024-11-08 DIAGNOSIS — E55.9 VITAMIN D DEFICIENCY: ICD-10-CM

## 2024-11-08 DIAGNOSIS — E66.3 OVERWEIGHT WITH BODY MASS INDEX (BMI) OF 28 TO 28.9 IN ADULT: ICD-10-CM

## 2024-11-08 DIAGNOSIS — Z13.29 THYROID DISORDER SCREENING: ICD-10-CM

## 2024-11-08 PROCEDURE — 99214 OFFICE O/P EST MOD 30 MIN: CPT | Performed by: NURSE PRACTITIONER

## 2024-11-08 RX ORDER — VALACYCLOVIR HYDROCHLORIDE 1 G/1
1000 TABLET, FILM COATED ORAL 2 TIMES DAILY
Qty: 20 TABLET | Refills: 3 | Status: SHIPPED | OUTPATIENT
Start: 2024-11-08 | End: 2024-11-18

## 2024-11-08 RX ORDER — OXYBUTYNIN CHLORIDE 10 MG/1
10 TABLET, EXTENDED RELEASE ORAL DAILY
Qty: 90 TABLET | Refills: 1 | Status: SHIPPED | OUTPATIENT
Start: 2024-11-08

## 2024-11-08 RX ORDER — PHENTERMINE HYDROCHLORIDE 37.5 MG/1
37.5 TABLET ORAL
Qty: 30 TABLET | Refills: 1 | Status: SHIPPED | OUTPATIENT
Start: 2024-11-08

## 2024-11-08 NOTE — PROGRESS NOTES
Subjective   Chief Complaint   Patient presents with    Med. follow up    Insomnia        Shannon Baum is a 39 y.o. female.    History of Present Illness  The patient presents for a follow-up visit.    She is experiencing severe insomnia, the cause of which is unknown to her. She reports that she can fall asleep initially but often wakes up and struggles to return to sleep for several hours. She attributes this to increased stress due to job hunting. Her physical activity has decreased recently. She does not snore or experience pain at night. She has not tried magnesium or melatonin for her sleep issues. She typically goes to bed around 10 PM.    She is currently taking Lexapro anxiety and depression. Taking oxybutynin at night, which she reports effective. Initially, she experienced dry mouth with oxybutynin, but this side effect has since subsided.     She has previously taken phentermine for weight loss for a duration of 90 days, which she found beneficial. She does not have any heart-related issues such as palpitations. She occasionally takes valacyclovir when she feels a fever blister developing.    I have reviewed the following portions of the patient's history and confirmed they are accurate: allergies, current medications, past family history, past medical history, past social history, past surgical history, and problem list    Review of Systems  Pertinent items are noted in HPI.     Current Outpatient Medications on File Prior to Visit   Medication Sig    cetirizine (zyrTEC) 10 MG tablet Take 1 tablet by mouth As Needed.    escitalopram (Lexapro) 20 MG tablet Take 1 tablet by mouth Daily.    hydrOXYzine pamoate (VISTARIL) 50 MG capsule Take 1 capsule by mouth 3 (Three) Times a Day As Needed for Anxiety.    levonorgestrel (MIRENA) 20 MCG/24HR IUD 1 each by Intrauterine route 1 (One) Time.    Omega-3 Fatty Acids (FISH OIL PO) Take  by mouth. (Patient not taking: Reported on 11/8/2024)     No current  "facility-administered medications on file prior to visit.       Objective   Vitals:    11/08/24 1033 11/08/24 1059   BP: 104/74    BP Location: Left arm    Patient Position: Sitting    Cuff Size: Adult    Pulse: 101 82   Temp: 97.3 °F (36.3 °C)    SpO2: 98%    Weight: 68.4 kg (150 lb 12.8 oz)    Height: 154.9 cm (60.98\")      Body mass index is 28.51 kg/m².    Physical Exam  Vitals reviewed.   Constitutional:       Appearance: Normal appearance. She is well-developed.   HENT:      Head: Normocephalic and atraumatic.      Nose: Nose normal.   Eyes:      General: Lids are normal.      Conjunctiva/sclera: Conjunctivae normal.      Pupils: Pupils are equal, round, and reactive to light.   Neck:      Thyroid: No thyromegaly.      Trachea: Trachea normal.   Cardiovascular:      Rate and Rhythm: Normal rate and regular rhythm.      Heart sounds: Normal heart sounds.   Pulmonary:      Effort: Pulmonary effort is normal. No respiratory distress.      Breath sounds: Normal breath sounds.   Skin:     General: Skin is warm and dry.   Neurological:      Mental Status: She is alert and oriented to person, place, and time.      GCS: GCS eye subscore is 4. GCS verbal subscore is 5. GCS motor subscore is 6.   Psychiatric:         Attention and Perception: Attention normal.         Mood and Affect: Mood normal.         Speech: Speech normal.         Behavior: Behavior normal. Behavior is cooperative.         Thought Content: Thought content normal.         Results      Assessment & Plan   Problem List Items Addressed This Visit       Anxiety    HSV-1 infection    Relevant Medications    valACYclovir (VALTREX) 1000 MG tablet     Other Visit Diagnoses       Primary insomnia    -  Primary    Overweight with body mass index (BMI) of 28 to 28.9 in adult        Relevant Medications    phentermine (ADIPEX-P) 37.5 MG tablet    Mixed stress and urge incontinence        Relevant Medications    oxybutynin XL (DITROPAN-XL) 10 MG 24 hr tablet    " Screening for blood disease        Relevant Orders    CBC (No Diff)    Comprehensive Metabolic Panel    Lipid Panel    Hemoglobin A1c    TSH Rfx On Abnormal To Free T4    Vitamin B12 & Folate    Vitamin D,25-Hydroxy    Thyroid disorder screening        Relevant Orders    TSH Rfx On Abnormal To Free T4    Lipid screening        Relevant Orders    Lipid Panel    Encounter for vitamin deficiency screening        Relevant Orders    Vitamin B12 & Folate    Vitamin D,25-Hydroxy    Screening for diabetes mellitus        Relevant Orders    Hemoglobin A1c    Vitamin D deficiency        Relevant Orders    Vitamin D,25-Hydroxy               Current Outpatient Medications:     cetirizine (zyrTEC) 10 MG tablet, Take 1 tablet by mouth As Needed., Disp: , Rfl:     escitalopram (Lexapro) 20 MG tablet, Take 1 tablet by mouth Daily., Disp: 90 tablet, Rfl: 3    hydrOXYzine pamoate (VISTARIL) 50 MG capsule, Take 1 capsule by mouth 3 (Three) Times a Day As Needed for Anxiety., Disp: 60 capsule, Rfl: 2    levonorgestrel (MIRENA) 20 MCG/24HR IUD, 1 each by Intrauterine route 1 (One) Time., Disp: , Rfl:     oxybutynin XL (DITROPAN-XL) 10 MG 24 hr tablet, Take 1 tablet by mouth Daily., Disp: 90 tablet, Rfl: 1    Omega-3 Fatty Acids (FISH OIL PO), Take  by mouth. (Patient not taking: Reported on 11/8/2024), Disp: , Rfl:     phentermine (ADIPEX-P) 37.5 MG tablet, Take 1 tablet by mouth Every Morning Before Breakfast., Disp: 30 tablet, Rfl: 1    valACYclovir (VALTREX) 1000 MG tablet, Take 1 tablet by mouth 2 (Two) Times a Day for 10 days., Disp: 20 tablet, Rfl: 3    Assessment & Plan  Patient will start OTC supplement such a melatonin or Calm sleep powder to see if insomnia can be managed naturally. Start phentermine for weight loss. Discussed with patient this medication can potentially make insomnia worse and to discontinue if experiences this. Follow low calorie diet and increase physical activity as tolerated. Continue lexapro and  hydroxyzine for anxiety. Continue oxybutynin for stress and urge incontinence. Discussed to use caution with combination of phentermine, hydroxyzine, and oxybutynin to side effects of dry mouth and dehydration. She will increase water intake. Patient given refill of valcyclovir in case of HSV1 flare up.  Patient will come by for yearly screening blood work.    Patient was educated on side effects of taking Phentermine medications including risk of addiction, increased heart rate, blood pressure, anxiety, insomnia, and dry mouth. Patient verbalized understanding and wants to continue with this medication. Patient will stop medication and schedule earlier follow up if these symptoms occur. Patient will increase water intake, follow a well balanced diet low in cholesterol/carbs/sugars, decrease portion sizes, and increase physical activity. NATHANIEL reviewed and appropriate.       Follow-up  Return in 2 months for follow up.         Plan of care reviewed with the patient at the conclusion of today's visit.  Education was provided regarding diagnosis, management, and any prescribed or recommended OTC medications.  Patient verbalized understanding of and agreement with management plan.     Return in about 2 months (around 1/8/2025), or if symptoms worsen or fail to improve, for Follow-up.      Transcribed from ambient dictation for JEREMY Hirsch by JEREMY Hirsch.  11/08/24   10:52 EST    Patient or patient representative verbalized consent for the use of Ambient Listening during the visit with  JEREMY Hirsch for chart documentation. 11/11/2024  22:10 EST

## 2025-01-09 ENCOUNTER — LAB (OUTPATIENT)
Dept: INTERNAL MEDICINE | Facility: CLINIC | Age: 40
End: 2025-01-09
Payer: COMMERCIAL

## 2025-01-09 ENCOUNTER — OFFICE VISIT (OUTPATIENT)
Dept: INTERNAL MEDICINE | Facility: CLINIC | Age: 40
End: 2025-01-09
Payer: COMMERCIAL

## 2025-01-09 VITALS
OXYGEN SATURATION: 98 % | SYSTOLIC BLOOD PRESSURE: 108 MMHG | DIASTOLIC BLOOD PRESSURE: 74 MMHG | BODY MASS INDEX: 27.49 KG/M2 | HEART RATE: 79 BPM | TEMPERATURE: 97.2 F | WEIGHT: 145.6 LBS | HEIGHT: 61 IN

## 2025-01-09 DIAGNOSIS — F41.9 ANXIETY: ICD-10-CM

## 2025-01-09 DIAGNOSIS — Z13.220 LIPID SCREENING: ICD-10-CM

## 2025-01-09 DIAGNOSIS — E55.9 VITAMIN D DEFICIENCY: ICD-10-CM

## 2025-01-09 DIAGNOSIS — Z13.29 THYROID DISORDER SCREENING: ICD-10-CM

## 2025-01-09 DIAGNOSIS — E66.3 OVERWEIGHT WITH BODY MASS INDEX (BMI) OF 27 TO 27.9 IN ADULT: ICD-10-CM

## 2025-01-09 DIAGNOSIS — Z13.1 SCREENING FOR DIABETES MELLITUS: ICD-10-CM

## 2025-01-09 DIAGNOSIS — Z13.21 ENCOUNTER FOR VITAMIN DEFICIENCY SCREENING: ICD-10-CM

## 2025-01-09 DIAGNOSIS — Z13.0 SCREENING FOR BLOOD DISEASE: ICD-10-CM

## 2025-01-09 DIAGNOSIS — N32.81 OVERACTIVE BLADDER: Primary | ICD-10-CM

## 2025-01-09 LAB
25(OH)D3 SERPL-MCNC: 30.4 NG/ML (ref 30–100)
ALBUMIN SERPL-MCNC: 4 G/DL (ref 3.5–5.2)
ALBUMIN/GLOB SERPL: 1.3 G/DL
ALP SERPL-CCNC: 83 U/L (ref 39–117)
ALT SERPL W P-5'-P-CCNC: 12 U/L (ref 1–33)
ANION GAP SERPL CALCULATED.3IONS-SCNC: 12.7 MMOL/L (ref 5–15)
AST SERPL-CCNC: 13 U/L (ref 1–32)
BILIRUB SERPL-MCNC: 0.8 MG/DL (ref 0–1.2)
BUN SERPL-MCNC: 11 MG/DL (ref 6–20)
BUN/CREAT SERPL: 11.2 (ref 7–25)
CALCIUM SPEC-SCNC: 9.3 MG/DL (ref 8.6–10.5)
CHLORIDE SERPL-SCNC: 104 MMOL/L (ref 98–107)
CHOLEST SERPL-MCNC: 173 MG/DL (ref 0–200)
CO2 SERPL-SCNC: 23.3 MMOL/L (ref 22–29)
CREAT SERPL-MCNC: 0.98 MG/DL (ref 0.57–1)
DEPRECATED RDW RBC AUTO: 39.1 FL (ref 37–54)
EGFRCR SERPLBLD CKD-EPI 2021: 76.4 ML/MIN/1.73
ERYTHROCYTE [DISTWIDTH] IN BLOOD BY AUTOMATED COUNT: 11.8 % (ref 12.3–15.4)
FOLATE SERPL-MCNC: 6.62 NG/ML (ref 4.78–24.2)
GLOBULIN UR ELPH-MCNC: 3.2 GM/DL
GLUCOSE SERPL-MCNC: 94 MG/DL (ref 65–99)
HBA1C MFR BLD: 4.6 % (ref 4.8–5.6)
HCT VFR BLD AUTO: 45.3 % (ref 34–46.6)
HDLC SERPL-MCNC: 42 MG/DL (ref 40–60)
HGB BLD-MCNC: 15 G/DL (ref 12–15.9)
LDLC SERPL CALC-MCNC: 118 MG/DL (ref 0–100)
LDLC/HDLC SERPL: 2.8 {RATIO}
MCH RBC QN AUTO: 30.1 PG (ref 26.6–33)
MCHC RBC AUTO-ENTMCNC: 33.1 G/DL (ref 31.5–35.7)
MCV RBC AUTO: 91 FL (ref 79–97)
PLATELET # BLD AUTO: 403 10*3/MM3 (ref 140–450)
PMV BLD AUTO: 10.1 FL (ref 6–12)
POTASSIUM SERPL-SCNC: 4.4 MMOL/L (ref 3.5–5.2)
PROT SERPL-MCNC: 7.2 G/DL (ref 6–8.5)
RBC # BLD AUTO: 4.98 10*6/MM3 (ref 3.77–5.28)
SODIUM SERPL-SCNC: 140 MMOL/L (ref 136–145)
TRIGL SERPL-MCNC: 66 MG/DL (ref 0–150)
TSH SERPL DL<=0.05 MIU/L-ACNC: 1.12 UIU/ML (ref 0.27–4.2)
VIT B12 BLD-MCNC: 351 PG/ML (ref 211–946)
VLDLC SERPL-MCNC: 13 MG/DL (ref 5–40)
WBC NRBC COR # BLD AUTO: 7.76 10*3/MM3 (ref 3.4–10.8)

## 2025-01-09 PROCEDURE — 82746 ASSAY OF FOLIC ACID SERUM: CPT | Performed by: NURSE PRACTITIONER

## 2025-01-09 PROCEDURE — 80050 GENERAL HEALTH PANEL: CPT | Performed by: NURSE PRACTITIONER

## 2025-01-09 PROCEDURE — 83036 HEMOGLOBIN GLYCOSYLATED A1C: CPT | Performed by: NURSE PRACTITIONER

## 2025-01-09 PROCEDURE — 99214 OFFICE O/P EST MOD 30 MIN: CPT | Performed by: NURSE PRACTITIONER

## 2025-01-09 PROCEDURE — 82607 VITAMIN B-12: CPT | Performed by: NURSE PRACTITIONER

## 2025-01-09 PROCEDURE — 36415 COLL VENOUS BLD VENIPUNCTURE: CPT | Performed by: NURSE PRACTITIONER

## 2025-01-09 PROCEDURE — 80061 LIPID PANEL: CPT | Performed by: NURSE PRACTITIONER

## 2025-01-09 PROCEDURE — 82306 VITAMIN D 25 HYDROXY: CPT | Performed by: NURSE PRACTITIONER

## 2025-01-09 RX ORDER — PHENTERMINE HYDROCHLORIDE 37.5 MG/1
37.5 TABLET ORAL
Qty: 30 TABLET | Refills: 1 | Status: SHIPPED | OUTPATIENT
Start: 2025-01-09

## 2025-01-09 NOTE — PROGRESS NOTES
Subjective   Chief Complaint   Patient presents with    Insomnia        Shannon Baum is a 39 y.o. female.    History of Present Illness  The patient presents for a 2-month follow-up on Adipex.    She has been on Adipex since 06/2024, initially for a 90-day period, followed by a brief discontinuation, and then resumed the medication in 11/2024.  She has had successful weight loss and doing well.  She reports no adverse effects such as heart palpitations or sleep disturbances. Her current weight is 145 pounds, with a history of weighing 98 pounds from middle school until the age of 25. She believes an ideal weight for her would be between 120 to 125 pounds. She recalls weighing 165 pounds in 05/2024 and 176 pounds during the previous Eri season. She initiated an exercise regimen in 01/2024, which resulted in some weight loss, but her progress has since plateaued.    She is also on Lexapro and oxybutynin, both of which she tolerates well. Initially, she experienced dry mouth with the combination of Adipex and oxybutynin, but this side effect has since resolved. She has a 90-day supply of her medications from [facility] and does not require any refills at this time. She has hydroxyzine available as needed, but she does not use it regularly.  Oxybutynin has greatly helped with overactive bladder and urinary urgency.    MEDICATIONS  Adipex, Lexapro, hydroxyzine, oxybutynin    I have reviewed the following portions of the patient's history and confirmed they are accurate: allergies, current medications, past family history, past medical history, past social history, past surgical history, and problem list    Review of Systems  Pertinent items are noted in HPI.     Current Outpatient Medications on File Prior to Visit   Medication Sig    cetirizine (zyrTEC) 10 MG tablet Take 1 tablet by mouth As Needed.    escitalopram (Lexapro) 20 MG tablet Take 1 tablet by mouth Daily.    hydrOXYzine pamoate (VISTARIL) 50 MG capsule  "Take 1 capsule by mouth 3 (Three) Times a Day As Needed for Anxiety.    levonorgestrel (MIRENA) 20 MCG/24HR IUD 1 each by Intrauterine route 1 (One) Time.    oxybutynin XL (DITROPAN-XL) 10 MG 24 hr tablet Take 1 tablet by mouth Daily.    [DISCONTINUED] phentermine (ADIPEX-P) 37.5 MG tablet Take 1 tablet by mouth Every Morning Before Breakfast.    Omega-3 Fatty Acids (FISH OIL PO) Take  by mouth. (Patient not taking: Reported on 11/8/2024)     No current facility-administered medications on file prior to visit.       Objective   Vitals:    01/09/25 0852   BP: 108/74   BP Location: Left arm   Patient Position: Sitting   Cuff Size: Adult   Pulse: 79   Temp: 97.2 °F (36.2 °C)   SpO2: 98%   Weight: 66 kg (145 lb 9.6 oz)   Height: 154.9 cm (60.98\")     Body mass index is 27.53 kg/m².    Physical Exam  Vitals reviewed.   Constitutional:       Appearance: Normal appearance. She is well-developed.   HENT:      Head: Normocephalic and atraumatic.      Nose: Nose normal.   Eyes:      General: Lids are normal.      Conjunctiva/sclera: Conjunctivae normal.      Pupils: Pupils are equal, round, and reactive to light.   Neck:      Thyroid: No thyromegaly.      Trachea: Trachea normal.   Cardiovascular:      Rate and Rhythm: Normal rate and regular rhythm.      Heart sounds: Normal heart sounds.   Pulmonary:      Effort: Pulmonary effort is normal. No respiratory distress.      Breath sounds: Normal breath sounds.   Skin:     General: Skin is warm and dry.   Neurological:      Mental Status: She is alert and oriented to person, place, and time.      GCS: GCS eye subscore is 4. GCS verbal subscore is 5. GCS motor subscore is 6.   Psychiatric:         Attention and Perception: Attention normal.         Mood and Affect: Mood normal.         Speech: Speech normal.         Behavior: Behavior normal. Behavior is cooperative.         Thought Content: Thought content normal.         Results      Lab Results   Component Value Date    WBC " 8.11 10/17/2023    HGB 14.1 10/17/2023    HCT 40.5 10/17/2023    MCV 89.6 10/17/2023     10/17/2023      Lab Results   Component Value Date    GLUCOSE 76 10/17/2023    BUN 11 10/17/2023    CREATININE 0.74 10/17/2023     10/17/2023    K 4.0 10/17/2023     10/17/2023    CALCIUM 9.3 10/17/2023    PROTEINTOT 6.9 10/17/2023    ALBUMIN 4.0 10/17/2023    ALT 26 10/17/2023    AST 19 10/17/2023    ALKPHOS 81 10/17/2023    BILITOT 0.5 10/17/2023    GLOB 2.9 10/17/2023    AGRATIO 1.4 10/17/2023    BCR 14.9 10/17/2023    ANIONGAP 8.0 10/17/2023    EGFR 107.7 10/17/2023      Lab Results   Component Value Date    HGBA1C 5.00 10/17/2023      Lab Results   Component Value Date    CHOL 175 10/17/2023    TRIG 71 10/17/2023    HDL 39 (L) 10/17/2023     (H) 10/17/2023      Lab Results   Component Value Date    TSH 1.880 10/17/2023          Assessment & Plan   Problem List Items Addressed This Visit       Anxiety    Overactive bladder - Primary     Other Visit Diagnoses       Overweight with body mass index (BMI) of 27 to 27.9 in adult        Relevant Medications    phentermine (ADIPEX-P) 37.5 MG tablet               Current Outpatient Medications:     cetirizine (zyrTEC) 10 MG tablet, Take 1 tablet by mouth As Needed., Disp: , Rfl:     escitalopram (Lexapro) 20 MG tablet, Take 1 tablet by mouth Daily., Disp: 90 tablet, Rfl: 3    hydrOXYzine pamoate (VISTARIL) 50 MG capsule, Take 1 capsule by mouth 3 (Three) Times a Day As Needed for Anxiety., Disp: 60 capsule, Rfl: 2    levonorgestrel (MIRENA) 20 MCG/24HR IUD, 1 each by Intrauterine route 1 (One) Time., Disp: , Rfl:     oxybutynin XL (DITROPAN-XL) 10 MG 24 hr tablet, Take 1 tablet by mouth Daily., Disp: 90 tablet, Rfl: 1    phentermine (ADIPEX-P) 37.5 MG tablet, Take 1 tablet by mouth Every Morning Before Breakfast., Disp: 30 tablet, Rfl: 1    Omega-3 Fatty Acids (FISH OIL PO), Take  by mouth. (Patient not taking: Reported on 11/8/2024), Disp: , Rfl:      Assessment & Plan  Continue oxybutynin for overactive bladder.  Patient will drink adequate water intake and continue with decrease in irritants such as citrus and caffeine.  Continue Lexapro daily and hydroxyzine as needed for anxiety.  Continue phentermine for weight loss.  Discussed with patient that BMI is now at 27.53.  If drops below 27 she no longer qualifies to take the phentermine for weight loss.  Discussed healthy BMI and diet.  She will follow-up in 2 months if BMI is greater than 27 and needs to continue.    Patient was educated on side effects of taking Phentermine medications including risk of addiction, increased heart rate, blood pressure, anxiety, insomnia, and dry mouth. Patient verbalized understanding and wants to continue with this medication. Patient will stop medication and schedule earlier follow up if these symptoms occur. Patient will increase water intake, follow a well balanced diet low in cholesterol/carbs/sugars, decrease portion sizes, and increase physical activity. NATHANIEL reviewed and appropriate.     Patient getting yearly screening labs completed today.      Follow-up  The patient will follow up in 2 months.         Plan of care reviewed with the patient at the conclusion of today's visit.  Education was provided regarding diagnosis, management, and any prescribed or recommended OTC medications.  Patient verbalized understanding of and agreement with management plan.     Return in about 2 months (around 3/9/2025), or if symptoms worsen or fail to improve, for Follow-up.      Transcribed from ambient dictation for JEREMY Hirsch by JEREMY Hirsch.  01/09/25   09:18 EST    Patient or patient representative verbalized consent for the use of Ambient Listening during the visit with  JEREMY Hirsch for chart documentation. 1/9/2025  09:18 EST

## 2025-01-26 DIAGNOSIS — N39.46 MIXED STRESS AND URGE INCONTINENCE: ICD-10-CM

## 2025-01-27 RX ORDER — OXYBUTYNIN CHLORIDE 10 MG/1
10 TABLET, EXTENDED RELEASE ORAL DAILY
Qty: 90 TABLET | Refills: 3 | Status: SHIPPED | OUTPATIENT
Start: 2025-01-27

## 2025-05-14 ENCOUNTER — TELEPHONE (OUTPATIENT)
Dept: INTERNAL MEDICINE | Age: 40
End: 2025-05-14

## 2025-05-14 NOTE — TELEPHONE ENCOUNTER
Pharmacy Name: [facility] [id number] - [place] [address] AT [facility] [address] - [phone]  - [phone] FX     Reference Number (if applicable):      Pharmacy representative name:      Pharmacy representative phone number: [phone]    What medication are you calling in regards to: phentermine (ADIPEX-P) 37.5 MG tablet     What question does the pharmacy have: SEE BELOW    Who is the provider that prescribed the medication: DEPAUL    Additional notes: PHARMACY STATES PT NEEDS NEW/MORE RECENT BMI & WEIGHT FOR THE REFILL OF PHENTERMINE SINCE IT HAS BEEN LONGER THAN THEY CAN ALLOW FOR AS REFILL